# Patient Record
Sex: FEMALE | NOT HISPANIC OR LATINO | URBAN - METROPOLITAN AREA
[De-identification: names, ages, dates, MRNs, and addresses within clinical notes are randomized per-mention and may not be internally consistent; named-entity substitution may affect disease eponyms.]

---

## 2017-11-20 ENCOUNTER — INPATIENT (INPATIENT)
Facility: HOSPITAL | Age: 77
LOS: 6 days | Discharge: NURSING, REHAB OR LONG-TERM CARE | End: 2017-11-27
Attending: INTERNAL MEDICINE | Admitting: INTERNAL MEDICINE
Payer: MEDICARE

## 2017-11-20 VITALS
SYSTOLIC BLOOD PRESSURE: 107 MMHG | RESPIRATION RATE: 18 BRPM | TEMPERATURE: 98 F | DIASTOLIC BLOOD PRESSURE: 65 MMHG | OXYGEN SATURATION: 100 % | HEART RATE: 142 BPM

## 2017-11-20 DIAGNOSIS — R55 SYNCOPE AND COLLAPSE: ICD-10-CM

## 2017-11-20 LAB
ALBUMIN SERPL ELPH-MCNC: 3.3 G/DL — SIGNIFICANT CHANGE UP (ref 3.3–5)
ALP SERPL-CCNC: 83 U/L — SIGNIFICANT CHANGE UP (ref 40–120)
ALT FLD-CCNC: 7 U/L — SIGNIFICANT CHANGE UP (ref 4–33)
APTT BLD: 33.6 SEC — SIGNIFICANT CHANGE UP (ref 27.5–37.4)
AST SERPL-CCNC: 18 U/L — SIGNIFICANT CHANGE UP (ref 4–32)
BASE EXCESS BLDV CALC-SCNC: 2.7 MMOL/L — SIGNIFICANT CHANGE UP
BASOPHILS # BLD AUTO: 0.09 K/UL — SIGNIFICANT CHANGE UP (ref 0–0.2)
BASOPHILS NFR BLD AUTO: 0.9 % — SIGNIFICANT CHANGE UP (ref 0–2)
BILIRUB SERPL-MCNC: 0.2 MG/DL — SIGNIFICANT CHANGE UP (ref 0.2–1.2)
BLOOD GAS VENOUS - CREATININE: 1.41 MG/DL — HIGH (ref 0.5–1.3)
BUN SERPL-MCNC: 30 MG/DL — HIGH (ref 7–23)
CALCIUM SERPL-MCNC: 9.2 MG/DL — SIGNIFICANT CHANGE UP (ref 8.4–10.5)
CHLORIDE BLDV-SCNC: 111 MMOL/L — HIGH (ref 96–108)
CHLORIDE SERPL-SCNC: 106 MMOL/L — SIGNIFICANT CHANGE UP (ref 98–107)
CK MB BLD-MCNC: 1.12 NG/ML — SIGNIFICANT CHANGE UP (ref 1–4.7)
CK MB BLD-MCNC: SIGNIFICANT CHANGE UP (ref 0–2.5)
CK SERPL-CCNC: 43 U/L — SIGNIFICANT CHANGE UP (ref 25–170)
CO2 SERPL-SCNC: 24 MMOL/L — SIGNIFICANT CHANGE UP (ref 22–31)
CREAT SERPL-MCNC: 1.53 MG/DL — HIGH (ref 0.5–1.3)
EOSINOPHIL # BLD AUTO: 0.89 K/UL — HIGH (ref 0–0.5)
EOSINOPHIL NFR BLD AUTO: 8.8 % — HIGH (ref 0–6)
GAS PNL BLDV: 140 MMOL/L — SIGNIFICANT CHANGE UP (ref 136–146)
GLUCOSE BLDV-MCNC: 98 — SIGNIFICANT CHANGE UP (ref 70–99)
GLUCOSE SERPL-MCNC: 98 MG/DL — SIGNIFICANT CHANGE UP (ref 70–99)
HCO3 BLDV-SCNC: 24 MMOL/L — SIGNIFICANT CHANGE UP (ref 20–27)
HCT VFR BLD CALC: 38.8 % — SIGNIFICANT CHANGE UP (ref 34.5–45)
HCT VFR BLDV CALC: 37.9 % — SIGNIFICANT CHANGE UP (ref 34.5–45)
HGB BLD-MCNC: 11.8 G/DL — SIGNIFICANT CHANGE UP (ref 11.5–15.5)
HGB BLDV-MCNC: 12.3 G/DL — SIGNIFICANT CHANGE UP (ref 11.5–15.5)
IMM GRANULOCYTES # BLD AUTO: 0.05 # — SIGNIFICANT CHANGE UP
IMM GRANULOCYTES NFR BLD AUTO: 0.5 % — SIGNIFICANT CHANGE UP (ref 0–1.5)
INR BLD: 1.44 — HIGH (ref 0.88–1.17)
LACTATE BLDV-MCNC: 2.6 MMOL/L — HIGH (ref 0.5–2)
LYMPHOCYTES # BLD AUTO: 1.86 K/UL — SIGNIFICANT CHANGE UP (ref 1–3.3)
LYMPHOCYTES # BLD AUTO: 18.5 % — SIGNIFICANT CHANGE UP (ref 13–44)
MCHC RBC-ENTMCNC: 30.3 PG — SIGNIFICANT CHANGE UP (ref 27–34)
MCHC RBC-ENTMCNC: 30.4 % — LOW (ref 32–36)
MCV RBC AUTO: 99.5 FL — SIGNIFICANT CHANGE UP (ref 80–100)
MONOCYTES # BLD AUTO: 0.58 K/UL — SIGNIFICANT CHANGE UP (ref 0–0.9)
MONOCYTES NFR BLD AUTO: 5.8 % — SIGNIFICANT CHANGE UP (ref 2–14)
NEUTROPHILS # BLD AUTO: 6.61 K/UL — SIGNIFICANT CHANGE UP (ref 1.8–7.4)
NEUTROPHILS NFR BLD AUTO: 65.5 % — SIGNIFICANT CHANGE UP (ref 43–77)
NRBC # FLD: 0 — SIGNIFICANT CHANGE UP
PCO2 BLDV: 58 MMHG — HIGH (ref 41–51)
PH BLDV: 7.31 PH — LOW (ref 7.32–7.43)
PLATELET # BLD AUTO: 308 K/UL — SIGNIFICANT CHANGE UP (ref 150–400)
PMV BLD: 9.6 FL — SIGNIFICANT CHANGE UP (ref 7–13)
PO2 BLDV: < 24 MMHG — LOW (ref 35–40)
POTASSIUM BLDV-SCNC: 4.6 MMOL/L — HIGH (ref 3.4–4.5)
POTASSIUM SERPL-MCNC: 4.9 MMOL/L — SIGNIFICANT CHANGE UP (ref 3.5–5.3)
POTASSIUM SERPL-SCNC: 4.9 MMOL/L — SIGNIFICANT CHANGE UP (ref 3.5–5.3)
PROT SERPL-MCNC: 7.5 G/DL — SIGNIFICANT CHANGE UP (ref 6–8.3)
PROTHROM AB SERPL-ACNC: 16.3 SEC — HIGH (ref 9.8–13.1)
RBC # BLD: 3.9 M/UL — SIGNIFICANT CHANGE UP (ref 3.8–5.2)
RBC # FLD: 14 % — SIGNIFICANT CHANGE UP (ref 10.3–14.5)
SAO2 % BLDV: 21.9 % — LOW (ref 60–85)
SODIUM SERPL-SCNC: 144 MMOL/L — SIGNIFICANT CHANGE UP (ref 135–145)
TROPONIN T SERPL-MCNC: < 0.06 NG/ML — SIGNIFICANT CHANGE UP (ref 0–0.06)
TSH SERPL-MCNC: 3 UIU/ML — SIGNIFICANT CHANGE UP (ref 0.27–4.2)
WBC # BLD: 10.08 K/UL — SIGNIFICANT CHANGE UP (ref 3.8–10.5)
WBC # FLD AUTO: 10.08 K/UL — SIGNIFICANT CHANGE UP (ref 3.8–10.5)

## 2017-11-20 PROCEDURE — 71010: CPT | Mod: 26

## 2017-11-20 RX ORDER — SODIUM CHLORIDE 9 MG/ML
1000 INJECTION INTRAMUSCULAR; INTRAVENOUS; SUBCUTANEOUS ONCE
Qty: 0 | Refills: 0 | Status: COMPLETED | OUTPATIENT
Start: 2017-11-20 | End: 2017-11-20

## 2017-11-20 RX ADMIN — SODIUM CHLORIDE 1000 MILLILITER(S): 9 INJECTION INTRAMUSCULAR; INTRAVENOUS; SUBCUTANEOUS at 22:14

## 2017-11-20 NOTE — ED ADULT NURSE NOTE - PMH
Cellulitis    Diabetes mellitus    Hypertension    Obesity    Urinary incontinence  STRESS incontinence

## 2017-11-20 NOTE — ED ADULT NURSE NOTE - CHIEF COMPLAINT QUOTE
Patient from [facility] but was at a  service c/o witnessed syncope for about 1 minute. Patient denies any falls, was sitting in wheelchair. Patient reports chest pain prior to syncope. Patient c/o epigastric pain currently. Hx. CVA left side residuals, htn, DM.

## 2017-11-20 NOTE — ED ADULT TRIAGE NOTE - CHIEF COMPLAINT QUOTE
Patient from [facility] but was at a  service c/o witnessed syncope for about 1 minute. Patient denies any falls, was sitting in wheelchair. Patient reports chest pain prior to syncope. Patient c/o epigastric pain currently. Hx. CVA left side residuals, htn, DM.

## 2017-11-20 NOTE — ED PROVIDER NOTE - MEDICAL DECISION MAKING DETAILS
76yo female with syncope, labs, ekg, ce, cdu for tele admit 78yo female with syncope. Well appearing and now asymptomatic. labs, ekg, ce, and likely tele admit

## 2017-11-20 NOTE — ED PROVIDER NOTE - OBJECTIVE STATEMENT
78yo female h/o CVA, DM, HTN p/w syncope. pt was in [employer] sitting in wheel chair, started to feel unwell and was calling out to her friends and then became unresponsive and slumped over in her chair. was back to baseline when she woke up. Denies chest pain, SOB, recent illness, fevers, chills. Denies seizure like activity. feels at baseline, no complaints at present 78yo female h/o CVA/left residuals, DM, HTN p/w brief loss of consciousness. pt was in [employer] sitting in wheel chair during a , started to feel unwell and was calling out to her friends and then became unresponsive and slumped over in her chair. was almost back to baseline when she woke up but her niece states she was not making sense for a time and appeared confused.  Now back to [facility] ([place] . Denies chest pain, SOB, recent illness, fevers, chills. Denies seizure like activity. feels at baseline, no complaints at present.    Of note, initial HR documented in triage was  bpm.  Patient has baseline tremor and documented HR was d/t device error.  The ECG done at the same time showed HR approx 80 bpm.

## 2017-11-20 NOTE — ED PROVIDER NOTE - ATTENDING CONTRIBUTION TO CARE
Attending Attestation: Dr. Foster  I have personally performed a history and physical examination of the patient and discussed management with the resident as well as the patient.  I reviewed the resident's note and agree with the documented findings and plan of care.  I have authored and modified critical sections of the Provider Note, including but not limited to HPI, Physical Exam and MDM.  76yo female with syncope. Well appearing and now asymptomatic. labs, ekg, ce, and likely tele admit

## 2017-11-20 NOTE — ED ADULT NURSE NOTE - OBJECTIVE STATEMENT
pt received to room TR B pt is a&0x3 pt comes to ED by EMS for a syncopal episode at a  home. pt VSS at this time. pt has hx of stroke pt comes from a NH. pt complains of LLE x 2 weeks. 20g placed in R AC labs were drawn and sent EDMD at bedside for eval Will continue to monitor the pt

## 2017-11-21 DIAGNOSIS — E66.9 OBESITY, UNSPECIFIED: ICD-10-CM

## 2017-11-21 DIAGNOSIS — E11.9 TYPE 2 DIABETES MELLITUS WITHOUT COMPLICATIONS: ICD-10-CM

## 2017-11-21 DIAGNOSIS — I10 ESSENTIAL (PRIMARY) HYPERTENSION: ICD-10-CM

## 2017-11-21 DIAGNOSIS — H10.9 UNSPECIFIED CONJUNCTIVITIS: ICD-10-CM

## 2017-11-21 DIAGNOSIS — N17.9 ACUTE KIDNEY FAILURE, UNSPECIFIED: ICD-10-CM

## 2017-11-21 DIAGNOSIS — Z29.9 ENCOUNTER FOR PROPHYLACTIC MEASURES, UNSPECIFIED: ICD-10-CM

## 2017-11-21 DIAGNOSIS — I48.0 PAROXYSMAL ATRIAL FIBRILLATION: ICD-10-CM

## 2017-11-21 DIAGNOSIS — R55 SYNCOPE AND COLLAPSE: ICD-10-CM

## 2017-11-21 DIAGNOSIS — E78.5 HYPERLIPIDEMIA, UNSPECIFIED: ICD-10-CM

## 2017-11-21 LAB
AMORPH CRY # UR COMP ASSIST: SIGNIFICANT CHANGE UP (ref 0–0)
APPEARANCE UR: CLEAR — SIGNIFICANT CHANGE UP
BACTERIA # UR AUTO: SIGNIFICANT CHANGE UP
BILIRUB UR-MCNC: NEGATIVE — SIGNIFICANT CHANGE UP
BLOOD UR QL VISUAL: NEGATIVE — SIGNIFICANT CHANGE UP
BUN SERPL-MCNC: 29 MG/DL — HIGH (ref 7–23)
CALCIUM SERPL-MCNC: 8.7 MG/DL — SIGNIFICANT CHANGE UP (ref 8.4–10.5)
CHLORIDE SERPL-SCNC: 107 MMOL/L — SIGNIFICANT CHANGE UP (ref 98–107)
CHOLEST SERPL-MCNC: 114 MG/DL — LOW (ref 120–199)
CK MB BLD-MCNC: 1.05 NG/ML — SIGNIFICANT CHANGE UP (ref 1–4.7)
CK SERPL-CCNC: 46 U/L — SIGNIFICANT CHANGE UP (ref 25–170)
CO2 SERPL-SCNC: 25 MMOL/L — SIGNIFICANT CHANGE UP (ref 22–31)
COLOR SPEC: SIGNIFICANT CHANGE UP
CREAT SERPL-MCNC: 1.38 MG/DL — HIGH (ref 0.5–1.3)
GLUCOSE SERPL-MCNC: 136 MG/DL — HIGH (ref 70–99)
GLUCOSE UR-MCNC: NEGATIVE — SIGNIFICANT CHANGE UP
HBA1C BLD-MCNC: 6 % — HIGH (ref 4–5.6)
HCT VFR BLD CALC: 35.1 % — SIGNIFICANT CHANGE UP (ref 34.5–45)
HDLC SERPL-MCNC: 53 MG/DL — SIGNIFICANT CHANGE UP (ref 45–65)
HGB BLD-MCNC: 10.5 G/DL — LOW (ref 11.5–15.5)
KETONES UR-MCNC: NEGATIVE — SIGNIFICANT CHANGE UP
LEUKOCYTE ESTERASE UR-ACNC: HIGH
LIPID PNL WITH DIRECT LDL SERPL: 47 MG/DL — SIGNIFICANT CHANGE UP
MAGNESIUM SERPL-MCNC: 1.7 MG/DL — SIGNIFICANT CHANGE UP (ref 1.6–2.6)
MCHC RBC-ENTMCNC: 29.7 PG — SIGNIFICANT CHANGE UP (ref 27–34)
MCHC RBC-ENTMCNC: 29.9 % — LOW (ref 32–36)
MCV RBC AUTO: 99.4 FL — SIGNIFICANT CHANGE UP (ref 80–100)
MUCOUS THREADS # UR AUTO: SIGNIFICANT CHANGE UP
NITRITE UR-MCNC: NEGATIVE — SIGNIFICANT CHANGE UP
NRBC # FLD: 0 — SIGNIFICANT CHANGE UP
PH UR: 6 — SIGNIFICANT CHANGE UP (ref 4.6–8)
PLATELET # BLD AUTO: 302 K/UL — SIGNIFICANT CHANGE UP (ref 150–400)
PMV BLD: 9.9 FL — SIGNIFICANT CHANGE UP (ref 7–13)
POTASSIUM SERPL-MCNC: 4.4 MMOL/L — SIGNIFICANT CHANGE UP (ref 3.5–5.3)
POTASSIUM SERPL-SCNC: 4.4 MMOL/L — SIGNIFICANT CHANGE UP (ref 3.5–5.3)
PROT UR-MCNC: 20 — SIGNIFICANT CHANGE UP
RBC # BLD: 3.53 M/UL — LOW (ref 3.8–5.2)
RBC # FLD: 14.1 % — SIGNIFICANT CHANGE UP (ref 10.3–14.5)
RBC CASTS # UR COMP ASSIST: HIGH (ref 0–?)
SODIUM SERPL-SCNC: 146 MMOL/L — HIGH (ref 135–145)
SP GR SPEC: 1.02 — SIGNIFICANT CHANGE UP (ref 1–1.03)
SQUAMOUS # UR AUTO: SIGNIFICANT CHANGE UP
TRIGL SERPL-MCNC: 127 MG/DL — SIGNIFICANT CHANGE UP (ref 10–149)
TROPONIN T SERPL-MCNC: < 0.06 NG/ML — SIGNIFICANT CHANGE UP (ref 0–0.06)
UROBILINOGEN FLD QL: NORMAL E.U. — SIGNIFICANT CHANGE UP (ref 0.1–0.2)
WBC # BLD: 10.06 K/UL — SIGNIFICANT CHANGE UP (ref 3.8–10.5)
WBC # FLD AUTO: 10.06 K/UL — SIGNIFICANT CHANGE UP (ref 3.8–10.5)
WBC UR QL: SIGNIFICANT CHANGE UP (ref 0–?)

## 2017-11-21 PROCEDURE — 70450 CT HEAD/BRAIN W/O DYE: CPT | Mod: 26

## 2017-11-21 RX ORDER — FERROUS SULFATE 325(65) MG
1 TABLET ORAL
Qty: 0 | Refills: 0 | COMMUNITY

## 2017-11-21 RX ORDER — LEVETIRACETAM 250 MG/1
1 TABLET, FILM COATED ORAL
Qty: 0 | Refills: 0 | COMMUNITY

## 2017-11-21 RX ORDER — TRAZODONE HCL 50 MG
50 TABLET ORAL DAILY
Qty: 0 | Refills: 0 | Status: DISCONTINUED | OUTPATIENT
Start: 2017-11-21 | End: 2017-11-27

## 2017-11-21 RX ORDER — METOPROLOL TARTRATE 50 MG
12.5 TABLET ORAL
Qty: 0 | Refills: 0 | Status: DISCONTINUED | OUTPATIENT
Start: 2017-11-21 | End: 2017-11-27

## 2017-11-21 RX ORDER — LEVETIRACETAM 250 MG/1
500 TABLET, FILM COATED ORAL
Qty: 0 | Refills: 0 | Status: DISCONTINUED | OUTPATIENT
Start: 2017-11-21 | End: 2017-11-27

## 2017-11-21 RX ORDER — ROSUVASTATIN CALCIUM 5 MG/1
1 TABLET ORAL
Qty: 0 | Refills: 0 | COMMUNITY

## 2017-11-21 RX ORDER — TOBRAMYCIN 0.3 %
1 DROPS OPHTHALMIC (EYE)
Qty: 0 | Refills: 0 | COMMUNITY
End: 2017-12-01

## 2017-11-21 RX ORDER — GABAPENTIN 400 MG/1
200 CAPSULE ORAL
Qty: 0 | Refills: 0 | Status: DISCONTINUED | OUTPATIENT
Start: 2017-11-21 | End: 2017-11-27

## 2017-11-21 RX ORDER — METFORMIN HYDROCHLORIDE 850 MG/1
1 TABLET ORAL
Qty: 0 | Refills: 0 | COMMUNITY

## 2017-11-21 RX ORDER — ISOSORBIDE MONONITRATE 60 MG/1
30 TABLET, EXTENDED RELEASE ORAL DAILY
Qty: 0 | Refills: 0 | Status: DISCONTINUED | OUTPATIENT
Start: 2017-11-21 | End: 2017-11-27

## 2017-11-21 RX ORDER — DEXTROSE 50 % IN WATER 50 %
25 SYRINGE (ML) INTRAVENOUS ONCE
Qty: 0 | Refills: 0 | Status: DISCONTINUED | OUTPATIENT
Start: 2017-11-21 | End: 2017-11-27

## 2017-11-21 RX ORDER — SIMETHICONE 80 MG/1
1 TABLET, CHEWABLE ORAL
Qty: 0 | Refills: 0 | COMMUNITY

## 2017-11-21 RX ORDER — INSULIN LISPRO 100/ML
0 VIAL (ML) SUBCUTANEOUS
Qty: 0 | Refills: 0 | COMMUNITY

## 2017-11-21 RX ORDER — INSULIN LISPRO 100/ML
VIAL (ML) SUBCUTANEOUS
Qty: 0 | Refills: 0 | Status: DISCONTINUED | OUTPATIENT
Start: 2017-11-21 | End: 2017-11-27

## 2017-11-21 RX ORDER — INSULIN LISPRO 100/ML
VIAL (ML) SUBCUTANEOUS AT BEDTIME
Qty: 0 | Refills: 0 | Status: DISCONTINUED | OUTPATIENT
Start: 2017-11-21 | End: 2017-11-27

## 2017-11-21 RX ORDER — DEXTROSE 50 % IN WATER 50 %
1 SYRINGE (ML) INTRAVENOUS ONCE
Qty: 0 | Refills: 0 | Status: DISCONTINUED | OUTPATIENT
Start: 2017-11-21 | End: 2017-11-27

## 2017-11-21 RX ORDER — HYDROCORTISONE 1 %
1 OINTMENT (GRAM) TOPICAL
Qty: 0 | Refills: 0 | Status: DISCONTINUED | OUTPATIENT
Start: 2017-11-21 | End: 2017-11-27

## 2017-11-21 RX ORDER — TOBRAMYCIN 0.3 %
1 DROPS OPHTHALMIC (EYE) THREE TIMES A DAY
Qty: 0 | Refills: 0 | Status: DISCONTINUED | OUTPATIENT
Start: 2017-11-21 | End: 2017-11-27

## 2017-11-21 RX ORDER — CHOLECALCIFEROL (VITAMIN D3) 125 MCG
1 CAPSULE ORAL
Qty: 0 | Refills: 0 | COMMUNITY

## 2017-11-21 RX ORDER — FERROUS SULFATE 325(65) MG
325 TABLET ORAL DAILY
Qty: 0 | Refills: 0 | Status: DISCONTINUED | OUTPATIENT
Start: 2017-11-21 | End: 2017-11-27

## 2017-11-21 RX ORDER — ACETAMINOPHEN 500 MG
2 TABLET ORAL
Qty: 0 | Refills: 0 | COMMUNITY

## 2017-11-21 RX ORDER — LISINOPRIL 2.5 MG/1
1 TABLET ORAL
Qty: 0 | Refills: 0 | COMMUNITY

## 2017-11-21 RX ORDER — RIVAROXABAN 15 MG-20MG
15 KIT ORAL EVERY 24 HOURS
Qty: 0 | Refills: 0 | Status: DISCONTINUED | OUTPATIENT
Start: 2017-11-21 | End: 2017-11-27

## 2017-11-21 RX ORDER — TRAZODONE HCL 50 MG
1 TABLET ORAL
Qty: 0 | Refills: 0 | COMMUNITY

## 2017-11-21 RX ORDER — FONDAPARINUX SODIUM 2.5 MG/.5ML
1 INJECTION, SOLUTION SUBCUTANEOUS
Qty: 0 | Refills: 0 | COMMUNITY

## 2017-11-21 RX ORDER — ATORVASTATIN CALCIUM 80 MG/1
40 TABLET, FILM COATED ORAL AT BEDTIME
Qty: 0 | Refills: 0 | Status: DISCONTINUED | OUTPATIENT
Start: 2017-11-21 | End: 2017-11-27

## 2017-11-21 RX ORDER — MAGNESIUM SULFATE 500 MG/ML
1 VIAL (ML) INJECTION ONCE
Qty: 0 | Refills: 0 | Status: COMPLETED | OUTPATIENT
Start: 2017-11-21 | End: 2017-11-21

## 2017-11-21 RX ORDER — SODIUM CHLORIDE 9 MG/ML
1000 INJECTION INTRAMUSCULAR; INTRAVENOUS; SUBCUTANEOUS
Qty: 0 | Refills: 0 | Status: DISCONTINUED | OUTPATIENT
Start: 2017-11-21 | End: 2017-11-27

## 2017-11-21 RX ORDER — CHOLECALCIFEROL (VITAMIN D3) 125 MCG
1000 CAPSULE ORAL DAILY
Qty: 0 | Refills: 0 | Status: DISCONTINUED | OUTPATIENT
Start: 2017-11-21 | End: 2017-11-27

## 2017-11-21 RX ORDER — DEXTROSE 50 % IN WATER 50 %
12.5 SYRINGE (ML) INTRAVENOUS ONCE
Qty: 0 | Refills: 0 | Status: DISCONTINUED | OUTPATIENT
Start: 2017-11-21 | End: 2017-11-27

## 2017-11-21 RX ORDER — LUBIPROSTONE 24 UG/1
1 CAPSULE, GELATIN COATED ORAL
Qty: 0 | Refills: 0 | COMMUNITY

## 2017-11-21 RX ORDER — PSYLLIUM SEED (WITH DEXTROSE)
0 POWDER (GRAM) ORAL
Qty: 0 | Refills: 0 | COMMUNITY

## 2017-11-21 RX ORDER — INSULIN GLARGINE 100 [IU]/ML
14 INJECTION, SOLUTION SUBCUTANEOUS AT BEDTIME
Qty: 0 | Refills: 0 | Status: DISCONTINUED | OUTPATIENT
Start: 2017-11-21 | End: 2017-11-27

## 2017-11-21 RX ORDER — METOPROLOL TARTRATE 50 MG
12.5 TABLET ORAL
Qty: 0 | Refills: 0 | COMMUNITY

## 2017-11-21 RX ORDER — GLUCAGON INJECTION, SOLUTION 0.5 MG/.1ML
1 INJECTION, SOLUTION SUBCUTANEOUS ONCE
Qty: 0 | Refills: 0 | Status: DISCONTINUED | OUTPATIENT
Start: 2017-11-21 | End: 2017-11-27

## 2017-11-21 RX ORDER — ISOSORBIDE MONONITRATE 60 MG/1
1 TABLET, EXTENDED RELEASE ORAL
Qty: 0 | Refills: 0 | COMMUNITY

## 2017-11-21 RX ORDER — INSULIN GLARGINE 100 [IU]/ML
14 INJECTION, SOLUTION SUBCUTANEOUS
Qty: 0 | Refills: 0 | COMMUNITY

## 2017-11-21 RX ORDER — SODIUM CHLORIDE 9 MG/ML
1000 INJECTION, SOLUTION INTRAVENOUS
Qty: 0 | Refills: 0 | Status: DISCONTINUED | OUTPATIENT
Start: 2017-11-21 | End: 2017-11-27

## 2017-11-21 RX ORDER — ACETAMINOPHEN 500 MG
650 TABLET ORAL EVERY 6 HOURS
Qty: 0 | Refills: 0 | Status: DISCONTINUED | OUTPATIENT
Start: 2017-11-21 | End: 2017-11-27

## 2017-11-21 RX ADMIN — SODIUM CHLORIDE 50 MILLILITER(S): 9 INJECTION INTRAMUSCULAR; INTRAVENOUS; SUBCUTANEOUS at 03:45

## 2017-11-21 RX ADMIN — LEVETIRACETAM 500 MILLIGRAM(S): 250 TABLET, FILM COATED ORAL at 23:01

## 2017-11-21 RX ADMIN — ISOSORBIDE MONONITRATE 30 MILLIGRAM(S): 60 TABLET, EXTENDED RELEASE ORAL at 12:27

## 2017-11-21 RX ADMIN — Medication 12.5 MILLIGRAM(S): at 18:48

## 2017-11-21 RX ADMIN — Medication 650 MILLIGRAM(S): at 03:46

## 2017-11-21 RX ADMIN — RIVAROXABAN 15 MILLIGRAM(S): KIT at 18:48

## 2017-11-21 RX ADMIN — Medication 650 MILLIGRAM(S): at 12:57

## 2017-11-21 RX ADMIN — Medication 1 DROP(S): at 21:25

## 2017-11-21 RX ADMIN — Medication 650 MILLIGRAM(S): at 12:27

## 2017-11-21 RX ADMIN — INSULIN GLARGINE 14 UNIT(S): 100 INJECTION, SOLUTION SUBCUTANEOUS at 21:24

## 2017-11-21 RX ADMIN — Medication 1000 UNIT(S): at 12:27

## 2017-11-21 RX ADMIN — Medication 1 DROP(S): at 06:39

## 2017-11-21 RX ADMIN — Medication 1 DROP(S): at 13:23

## 2017-11-21 RX ADMIN — Medication 650 MILLIGRAM(S): at 04:36

## 2017-11-21 RX ADMIN — ATORVASTATIN CALCIUM 40 MILLIGRAM(S): 80 TABLET, FILM COATED ORAL at 21:27

## 2017-11-21 RX ADMIN — Medication 12.5 MILLIGRAM(S): at 06:38

## 2017-11-21 RX ADMIN — Medication 50 MILLIGRAM(S): at 13:23

## 2017-11-21 RX ADMIN — Medication 100 GRAM(S): at 21:24

## 2017-11-21 RX ADMIN — Medication 325 MILLIGRAM(S): at 12:27

## 2017-11-21 RX ADMIN — LEVETIRACETAM 500 MILLIGRAM(S): 250 TABLET, FILM COATED ORAL at 06:39

## 2017-11-21 RX ADMIN — GABAPENTIN 200 MILLIGRAM(S): 400 CAPSULE ORAL at 18:48

## 2017-11-21 RX ADMIN — GABAPENTIN 200 MILLIGRAM(S): 400 CAPSULE ORAL at 06:39

## 2017-11-21 RX ADMIN — Medication 1 APPLICATION(S): at 23:01

## 2017-11-21 NOTE — H&P ADULT - ATTENDING COMMENTS
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    #Syncope-   Patient has ruled out for acute coronary syndrome.  No ventricular ectopy or bradycardia on telemetry thus far.  Admit to telemetry and obtain TTE to assess for valvulopathy (known RV dysfunction).    #A-fib- rate controlled.   Continue Xarelto and statin for h/o CVA    #HTN- BP controlled on current regimen. 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    #Syncope-   Patient has ruled out for acute coronary syndrome.  No ventricular ectopy or bradycardia on telemetry thus far.  Admit to telemetry and obtain TTE to assess for valvulopathy (known RV dysfunction).    #Paroxysmal A-fib- Admission EKG is sinus rhythm with no ischemic changes.  Continue Xarelto and statin for h/o CVA    #HTN- BP controlled on current regimen.

## 2017-11-21 NOTE — H&P ADULT - PROBLEM SELECTOR PLAN 7
[id number] score of 7, as per talking to nursing home patient used to take Coumadin and was started on Xarelto on 8/2017 for atrial fibrillation  Will continue with Xarelto for now  Continue with Metoprolol for rate control

## 2017-11-21 NOTE — H&P ADULT - RS GEN PE MLT RESP DETAILS PC
no intercostal retractions/no wheezes/normal/good air movement/clear to auscultation bilaterally/no chest wall tenderness/airway patent/respirations non-labored/breath sounds equal/no rales/no rhonchi

## 2017-11-21 NOTE — H&P ADULT - NEGATIVE OPHTHALMOLOGIC SYMPTOMS
no photophobia/no lacrimation L/no lacrimation R/no blurred vision L/no blurred vision R/no discharge R/no discharge L/no diplopia

## 2017-11-21 NOTE — H&P ADULT - GASTROINTESTINAL DETAILS
bowel sounds normal/nontender/no distention/soft/no guarding/normal/no rebound tenderness/no rigidity

## 2017-11-21 NOTE — H&P ADULT - PROBLEM SELECTOR PLAN 2
BUN/Cr: 30/1.53 likely secondary to dehydration as Cr was normal last admission in 2012  Avoid nephrotoxic medications   Consider Renal consult in am if Cr does not improved after fluids

## 2017-11-21 NOTE — H&P ADULT - HISTORY OF PRESENT ILLNESS
78 y/o F with PMH of Obesity, HTN, DM, CVA(with left sided residuals), HLD, Depression, Asthma, ?Possible atrial fibrillation(on Xarelto) presented with the complaint of syncope while at [employer] for a . As per the patient she was in her usual state of health and was at a  yesterday when she had an episode of syncope. Patient stated that she was sitting in the wheelchair and then felt "warm all over" and then remembers people around her trying to wake her up. Patient is unsure how long she had LOC, and when she woke up she knew where she was but was "a little confused and not making sense at that time". Patient stated that prior to passing out she felt lightheadedness and dizziness. Patient does not think she had any head trauma or ay falls. Patient unsure if she had any seizure like activity, tongue laceration, or any bowel or bladder incontinence. Patient denied any headaches, changes in vision, CP, fevers, chills, N/V/D/C, abdominal pain, dysuria, cough, melena, hematochezia, recent travel, sick contact, pleuritic or positional chest pain.      On ED admission EKG revealed EKG: Atrial fibrillation at a rate of 86 with QTc of 440, CE x 1: Negative, BUN/Cr: 30/1.53. Prelim CXR: No emergent findings. When examined patient is resting in the stretcher and denied any current complaints.

## 2017-11-21 NOTE — H&P ADULT - NSHPLABSRESULTS_GEN_ALL_CORE
11.8   10.08 )-----------( 308      ( 20 Nov 2017 21:35 )             38.8   11-20    144  |  106  |  30<H>  ----------------------------<  98  4.9   |  24  |  1.53<H>    Ca    9.2      20 Nov 2017 21:35    TPro  7.5  /  Alb  3.3  /  TBili  0.2  /  DBili  x   /  AST  18  /  ALT  7   /  AlkPhos  83  11-20    CARDIAC MARKERS ( 20 Nov 2017 21:35 )  x     / < 0.06 ng/mL / 43 u/L / 1.12 ng/mL / x        Prelim CXR: No emergent findings.    EKG: Atrial fibrillation at a rate of 86 with QTc of 440

## 2017-11-21 NOTE — PROVIDER CONTACT NOTE (OTHER) - ASSESSMENT
Patient blood glucose noted to be at 67. Patient asymptomatic at this time. VS within normal limits. No complaints of dizziness or headache, no diaphoresis noted.

## 2017-11-21 NOTE — H&P ADULT - NEGATIVE NEUROLOGICAL SYMPTOMS
no weakness/no paresthesias/no generalized seizures/no transient paralysis/no vertigo/no loss of sensation/no headache/no tremors/no focal seizures/no hemiparesis

## 2017-11-21 NOTE — H&P ADULT - PROBLEM SELECTOR PLAN 6
Encourage weight loss via diet and exercise  Consult with patient about healthy eating habits and various exercises

## 2017-11-21 NOTE — H&P ADULT - PROBLEM SELECTOR PLAN 1
Differential include dehydration vs cardiac arrhythmia vs possible hypoglycemia   Will monitor on telemetry, serial CE's, serial EKG prn for any episodes of chest pain   HgbA1C, TSH, lipid profile, CBC, CMP in am   TTE ordered to evaluate LVEF   Orthostatics ordered  CT head w/o ordered   UA/Urine cultures ordered   Consider calling neurology consult in am for Hx of seizures and here with syncope Differential include dehydration vs cardiac arrhythmia vs possible hypoglycemia. Patient s/p 1L of fluid in the ED.    Will monitor on telemetry, serial CE's, serial EKG prn for any episodes of chest pain   HgbA1C, TSH, lipid profile, CBC, CMP in am   TTE ordered to evaluate LVEF   Orthostatics ordered, and started on IVF at 50cc/hr for 8 hours   CT head w/o ordered   UA/Urine cultures ordered   Consider calling neurology consult in am as patient with Hx of seizures and here with syncope  Continue with Keppra 500mg BID   Will check Keppra level with am labs

## 2017-11-21 NOTE — H&P ADULT - PROBLEM SELECTOR PLAN 3
As per talking to nursing home patient is to be started on Tobramycin for right eye conjunctivitis yesterday 11/20 and to continue for 10 days.  Will order Tobramycin

## 2017-11-21 NOTE — CONSULT NOTE ADULT - SUBJECTIVE AND OBJECTIVE BOX
Chief Complaint/Reason for Admission: Syncope	  History of Present Illness: 	  78 y/o F with PMH of Obesity, HTN, DM, CVA(with left sided residuals), HLD, Depression, Asthma, ?Possible atrial fibrillation(on Xarelto) presented with the complaint of syncope while at [employer] for a . As per the patient she was in her usual state of health and was at a  yesterday when she had an episode of syncope. Patient stated that she was sitting in the wheelchair and then felt "warm all over" and then remembers people around her trying to wake her up. Patient is unsure how long she had LOC, and when she woke up she knew where she was but was "a little confused and not making sense at that time". Patient stated that prior to passing out she felt lightheadedness and dizziness. Patient does not think she had any head trauma or ay falls. Patient unsure if she had any seizure like activity, tongue laceration, or any bowel or bladder incontinence. Patient denied any headaches, changes in vision, CP, fevers, chills, N/V/D/C, abdominal pain, dysuria, cough, melena, hematochezia, recent travel, sick contact, pleuritic or positional chest pain.      On ED admission EKG revealed EKG: Atrial fibrillation at a rate of 86 with QTc of 440, CE x 1: Negative, BUN/Cr: 30/1.53. Prelim CXR: No emergent findings. When examined patient is resting in the stretcher and denied any current complaints.     Review of Systems:  · Negative General Symptoms	no fever; no chills; no sweating; no anorexia; no weight loss; no weight gain; no malaise; no fatigue	  · Negative Skin Symptoms	no rash; no itching; no dryness	  · Negative Ophthalmologic Symptoms	no diplopia; no photophobia; no lacrimation L; no lacrimation R; no blurred vision L; no blurred vision R; no discharge L; no discharge R	  · Negative ENMT Symptoms	no hearing difficulty; no ear pain; no tinnitus; no vertigo; no sinus symptoms; no nasal congestion; no nasal discharge	  · Negative Respiratory and Thorax Symptoms	no wheezing; no cough; no hemoptysis; no pleuritic chest pain	  · Respiratory and Thorax Symptoms	dyspnea	  · Negative Cardiovascular Symptoms	no chest pain; no palpitations; no dyspnea on exertion; no orthopnea; no paroxysmal nocturnal dyspnea	  · Cardiovascular Symptoms	peripheral edema	  · Negative Gastrointestinal Symptoms	no nausea; no vomiting; no diarrhea; no constipation; no change in bowel habits; no abdominal pain; no melena; no hematochezia	  · Negative General Genitourinary Symptoms	no hematuria; no renal colic; no flank pain L; no flank pain R; no dysuria	  · Negative Musculoskeletal Symptoms	no arthralgia; no joint swelling; no myalgia; no muscle cramps; no muscle weakness; no stiffness	  · Musculoskeletal Symptoms	arthritis	  · Negative Neurological Symptoms	no transient paralysis; no weakness; no paresthesias; no generalized seizures; no focal seizures; no tremors; no vertigo; no loss of sensation; no headache; no hemiparesis	  · Neurological Symptoms	syncope; difficulty walking; loss of consciousness; confusion	      Allergies and Intolerances:        Allergies:  	No Known Allergies:     Home Medications:   * Patient Currently Takes Medications as of 2017 01:42 documented in Structured Notes  · 	Amitiza 24 mcg oral capsule: 1 cap(s) orally 2 times a day  · 	Crestor 10 mg oral tablet: 1 tab(s) orally once a day (at bedtime)  · 	simethicone 80 mg oral tablet: 1 tab(s) orally 2 times a day  · 	Keppra 500 mg oral tablet: 1 tab(s) orally 2 times a day  · 	isosorbide mononitrate 30 mg oral tablet, extended release: 1 tab(s) orally once a day (in the morning)  · 	Vitamin D3 1000 intl units oral tablet: 1 tab(s) orally once a day  · 	metFORMIN 1000 mg oral tablet: 1 tab(s) orally 2 times a day  · 	traZODone 50 mg oral tablet: 1 tab(s) orally once a day  · 	Lantus 100 units/mL subcutaneous solution: 14 unit(s) subcutaneous once a day (at bedtime)  · 	ferrous sulfate 325 mg (65 mg elemental iron) oral delayed release tablet: 1 tab(s) orally once a day  · 	Artificial Tears ophthalmic solution: 1 drop(s) to each affected eye 2 times a day  · 	Neurontin 300 mg oral capsule: 1 cap(s) orally 4 times a day  · 	Xarelto 15 mg oral tablet: 1 tab(s) orally once a day (in the evening)  · 	lisinopril 2.5 mg oral tablet: 1 tab(s) orally once a day  · 	hydroCHLOROthiazide 12.5 mg oral capsule: 1 cap(s) orally once a day  · 	metoprolol: 12.5 milligram(s) orally 2 times a day  · 	tobramycin 0.3% ophthalmic solution: 1 drop(s) to each affected eye 3 times a day  · 	Tylenol 325 mg oral tablet: 2 tab(s) orally every 4 hours, As Needed - for muscle spasm, for moderate pain  · 	Metamucil 3.4 g/5.2 g oral powder for reconstitution:   · 	HumaLOG 100 units/mL subcutaneous solution: Sliding scale    Patient History:   Past Medical History:  Cellulitis    Diabetes mellitus    Hypertension    Obesity    Urinary incontinence  STRESS incontinence.    Past Surgical History:  History of hysterectomy    S/P knee replacement.    Family History:  No pertinent family history in first degree relatives.    Social History:  Social History (marital status, living situation, occupation, tobacco use, alcohol and drug use, and sexual history): , lives at rehab, retired Never smoked/but used to drink occasionally	    Tobacco Screening:  · Core Measure Site	No	  · Has the patient used tobacco in the past 30 days?	No	    Risk Assessment:   Present on Admission:  Deep Venous Thrombosis	no	  Pulmonary Embolus	no	  Urinary Catheter	no	  Central Venous Catheter/PICC Line	no	  Surgical Site Incision	no	  Pressure Ulcer(s)	no	    Heart Failure:  Does this patient have a history of or has been diagnosed with heart failure? no.      Physical Exam:  · Constitutional	detailed exam	  · Constitutional Details	well-developed; well-groomed; well-nourished; obese	  · Eyes	detailed exam	  · Eyes Details	conjunctiva clear	  · Neck	detailed exam	  · Neck Details	normal; supple; no JVD	  · Respiratory	detailed exam	  · Respiratory Details	normal; airway patent; breath sounds equal; good air movement; respirations non-labored; clear to auscultation bilaterally; no chest wall tenderness; no intercostal retractions; no rales; no rhonchi; no wheezes	  · Cardiovascular	detailed exam	  · Cardiovascular Details	regular rate and rhythm  no rub  no murmur	  · Cardiovascular Details	positive S1; positive S2	  · Gastrointestinal	detailed exam	  · GI Normal	normal; soft; nontender; no distention; bowel sounds normal; no rebound tenderness; no guarding; no rigidity	  · Extremities	detailed exam	  · Extremities Details	normal; no clubbing; no cyanosis; pedal edema	  · Pedal Edema Severity	1+	  · Pedal Edema Type	non-pitting	  · Extremities Comments	+1 non-pitting traceable edema noted b/l from ankle to mid tibia	  · Vascular	detailed exam	  · Radial Pulse	right normal; left normal; +2 pulses b/l	  · DP Pulse	+1 pulses b/l	  · PT Pulse	+1 pulses b/l	  · Neurological	detailed exam	  · Neurological Details	alert and oriented x 3; responds to verbal commands; sensation intact	  · Skin	detailed exam	  · Skin Comments	Bilateral LE petechiae noted in the anterior and mid tibia	  · Musculoskeletal	detailed exam	  · Musculoskeletal Details	no joint swelling; no joint erythema; no joint warmth	      Labs and Results:  Labs, Radiology, Cardiology, and Other Results: 11.8   10.08 )-----------( 308      ( 2017 21:35 )             38.8   11-20   144  |  106  |  30<H>  ----------------------------<  98  4.9   |  24  |  1.53<H>   Ca    9.2      2017 21:35   TPro  7.5  /  Alb  3.3  /  TBili  0.2  /  DBili  x   /  AST  18  /  ALT  7   /  AlkPhos  83  11-20   CARDIAC MARKERS ( 2017 21:35 )  x     / < 0.06 ng/mL / 43 u/L / 1.12 ng/mL / x       Prelim CXR: No emergent findings.   EKG: Atrial fibrillation at a rate of 86 with QTc of 440	    Assessment and Plan:   Assessment:  · Assessment		  78 y/o F with PMH of Obesity, HTN, DM, CVA(with left sided residuals), HLD, Depression, Asthma, ?Possible atrial fibrillation(on Xarelto) presented with the complaint of syncope while at [employer] for a     Problem/Plan - 1:  ·  Problem: Syncope, unspecified syncope type.  Plan: Differential include dehydration vs cardiac arrhythmia vs possible hypoglycemia. Patient s/p 1L of fluid in the ED.    Will monitor on telemetry, serial CE's, serial EKG prn for any episodes of chest pain   HgbA1C, TSH, lipid profile, CBC, CMP in am   TTE ordered to evaluate LVEF   Orthostatics ordered, and started on IVF at 50cc/hr for 8 hours   CT head w/o ordered   UA/Urine cultures ordered   Consider calling neurology consult in am as patient with Hx of seizures and here with syncope  Continue with Keppra 500mg BID   Will check Keppra level with am labs.     Problem/Plan - 2:  ·  Problem: NIMA (acute kidney injury).  Plan: BUN/Cr: 30/1.53 likely secondary to dehydration as Cr was normal last admission in   Avoid nephrotoxic medications     Problem/Plan - 3:  ·  Problem: Conjunctivitis.  Plan: As per talking to nursing home patient is to be started on Tobramycin for right eye conjunctivitis yesterday  and to continue for 10 days.  Will order Tobramycin.     Problem/Plan - 4:  ·  Problem: Hypertension.  Plan: Continue with antihypertensive medications   DASH diet recommended.     Problem/Plan - 5:  ·  Problem: Type 2 diabetes mellitus without complication, with long-term current use of insulin.  Plan: On insulin sliding scale(humalog), Lantus 14U QHS  FS TID and at bedtime, HgbA1C in am.

## 2017-11-21 NOTE — H&P ADULT - ASSESSMENT
78 y/o F with PMH of Obesity, HTN, DM, CVA(with left sided residuals), HLD, Depression, Asthma, ?Possible atrial fibrillation(on Xarelto) presented with the complaint of syncope while at [employer] for a

## 2017-11-21 NOTE — H&P ADULT - NEGATIVE GASTROINTESTINAL SYMPTOMS
no vomiting/no diarrhea/no nausea/no constipation/no melena/no change in bowel habits/no hematochezia/no abdominal pain

## 2017-11-21 NOTE — H&P ADULT - NEGATIVE ENMT SYMPTOMS
no ear pain/no tinnitus/no hearing difficulty/no sinus symptoms/no nasal discharge/no vertigo/no nasal congestion

## 2017-11-22 LAB
BASOPHILS # BLD AUTO: 0.08 K/UL — SIGNIFICANT CHANGE UP (ref 0–0.2)
BASOPHILS NFR BLD AUTO: 1 % — SIGNIFICANT CHANGE UP (ref 0–2)
BUN SERPL-MCNC: 31 MG/DL — HIGH (ref 7–23)
BUN SERPL-MCNC: 31 MG/DL — HIGH (ref 7–23)
CALCIUM SERPL-MCNC: 9 MG/DL — SIGNIFICANT CHANGE UP (ref 8.4–10.5)
CALCIUM SERPL-MCNC: 9 MG/DL — SIGNIFICANT CHANGE UP (ref 8.4–10.5)
CHLORIDE SERPL-SCNC: 108 MMOL/L — HIGH (ref 98–107)
CHLORIDE SERPL-SCNC: 108 MMOL/L — HIGH (ref 98–107)
CO2 SERPL-SCNC: 23 MMOL/L — SIGNIFICANT CHANGE UP (ref 22–31)
CO2 SERPL-SCNC: 23 MMOL/L — SIGNIFICANT CHANGE UP (ref 22–31)
CREAT SERPL-MCNC: 1.33 MG/DL — HIGH (ref 0.5–1.3)
CREAT SERPL-MCNC: 1.33 MG/DL — HIGH (ref 0.5–1.3)
EOSINOPHIL # BLD AUTO: 1.18 K/UL — HIGH (ref 0–0.5)
EOSINOPHIL NFR BLD AUTO: 15.3 % — HIGH (ref 0–6)
GLUCOSE SERPL-MCNC: 103 MG/DL — HIGH (ref 70–99)
GLUCOSE SERPL-MCNC: 103 MG/DL — HIGH (ref 70–99)
HCT VFR BLD CALC: 34.1 % — LOW (ref 34.5–45)
HCT VFR BLD CALC: 34.1 % — LOW (ref 34.5–45)
HGB BLD-MCNC: 10.4 G/DL — LOW (ref 11.5–15.5)
HGB BLD-MCNC: 10.4 G/DL — LOW (ref 11.5–15.5)
IMM GRANULOCYTES # BLD AUTO: 0.02 # — SIGNIFICANT CHANGE UP
IMM GRANULOCYTES NFR BLD AUTO: 0.3 % — SIGNIFICANT CHANGE UP (ref 0–1.5)
LEVETIRACETAM SERPL-MCNC: 28.7 MCG/ML — SIGNIFICANT CHANGE UP (ref 12–46)
LYMPHOCYTES # BLD AUTO: 2.51 K/UL — SIGNIFICANT CHANGE UP (ref 1–3.3)
LYMPHOCYTES # BLD AUTO: 32.6 % — SIGNIFICANT CHANGE UP (ref 13–44)
MAGNESIUM SERPL-MCNC: 2.1 MG/DL — SIGNIFICANT CHANGE UP (ref 1.6–2.6)
MCHC RBC-ENTMCNC: 29.9 PG — SIGNIFICANT CHANGE UP (ref 27–34)
MCHC RBC-ENTMCNC: 29.9 PG — SIGNIFICANT CHANGE UP (ref 27–34)
MCHC RBC-ENTMCNC: 30.5 % — LOW (ref 32–36)
MCHC RBC-ENTMCNC: 30.5 % — LOW (ref 32–36)
MCV RBC AUTO: 98 FL — SIGNIFICANT CHANGE UP (ref 80–100)
MCV RBC AUTO: 98 FL — SIGNIFICANT CHANGE UP (ref 80–100)
MONOCYTES # BLD AUTO: 0.56 K/UL — SIGNIFICANT CHANGE UP (ref 0–0.9)
MONOCYTES NFR BLD AUTO: 7.3 % — SIGNIFICANT CHANGE UP (ref 2–14)
NEUTROPHILS # BLD AUTO: 3.36 K/UL — SIGNIFICANT CHANGE UP (ref 1.8–7.4)
NEUTROPHILS NFR BLD AUTO: 43.5 % — SIGNIFICANT CHANGE UP (ref 43–77)
NRBC # FLD: 0 — SIGNIFICANT CHANGE UP
NRBC # FLD: 0 — SIGNIFICANT CHANGE UP
PHOSPHATE SERPL-MCNC: 2.9 MG/DL — SIGNIFICANT CHANGE UP (ref 2.5–4.5)
PLATELET # BLD AUTO: 208 K/UL — SIGNIFICANT CHANGE UP (ref 150–400)
PLATELET # BLD AUTO: 208 K/UL — SIGNIFICANT CHANGE UP (ref 150–400)
PMV BLD: 11.2 FL — SIGNIFICANT CHANGE UP (ref 7–13)
PMV BLD: 11.2 FL — SIGNIFICANT CHANGE UP (ref 7–13)
POTASSIUM SERPL-MCNC: 4.4 MMOL/L — SIGNIFICANT CHANGE UP (ref 3.5–5.3)
POTASSIUM SERPL-MCNC: 4.4 MMOL/L — SIGNIFICANT CHANGE UP (ref 3.5–5.3)
POTASSIUM SERPL-SCNC: 4.4 MMOL/L — SIGNIFICANT CHANGE UP (ref 3.5–5.3)
POTASSIUM SERPL-SCNC: 4.4 MMOL/L — SIGNIFICANT CHANGE UP (ref 3.5–5.3)
RBC # BLD: 3.48 M/UL — LOW (ref 3.8–5.2)
RBC # BLD: 3.48 M/UL — LOW (ref 3.8–5.2)
RBC # FLD: 14.2 % — SIGNIFICANT CHANGE UP (ref 10.3–14.5)
RBC # FLD: 14.2 % — SIGNIFICANT CHANGE UP (ref 10.3–14.5)
SODIUM SERPL-SCNC: 145 MMOL/L — SIGNIFICANT CHANGE UP (ref 135–145)
SODIUM SERPL-SCNC: 145 MMOL/L — SIGNIFICANT CHANGE UP (ref 135–145)
WBC # BLD: 7.71 K/UL — SIGNIFICANT CHANGE UP (ref 3.8–10.5)
WBC # BLD: 7.71 K/UL — SIGNIFICANT CHANGE UP (ref 3.8–10.5)
WBC # FLD AUTO: 7.71 K/UL — SIGNIFICANT CHANGE UP (ref 3.8–10.5)
WBC # FLD AUTO: 7.71 K/UL — SIGNIFICANT CHANGE UP (ref 3.8–10.5)

## 2017-11-22 RX ORDER — SENNA PLUS 8.6 MG/1
2 TABLET ORAL AT BEDTIME
Qty: 0 | Refills: 0 | Status: DISCONTINUED | OUTPATIENT
Start: 2017-11-22 | End: 2017-11-27

## 2017-11-22 RX ORDER — DOCUSATE SODIUM 100 MG
100 CAPSULE ORAL
Qty: 0 | Refills: 0 | Status: DISCONTINUED | OUTPATIENT
Start: 2017-11-22 | End: 2017-11-27

## 2017-11-22 RX ADMIN — Medication 1 APPLICATION(S): at 21:50

## 2017-11-22 RX ADMIN — Medication 1 APPLICATION(S): at 05:54

## 2017-11-22 RX ADMIN — Medication 1: at 18:15

## 2017-11-22 RX ADMIN — ATORVASTATIN CALCIUM 40 MILLIGRAM(S): 80 TABLET, FILM COATED ORAL at 21:48

## 2017-11-22 RX ADMIN — Medication 650 MILLIGRAM(S): at 06:40

## 2017-11-22 RX ADMIN — Medication 650 MILLIGRAM(S): at 05:49

## 2017-11-22 RX ADMIN — Medication 1 DROP(S): at 05:49

## 2017-11-22 RX ADMIN — Medication 650 MILLIGRAM(S): at 18:50

## 2017-11-22 RX ADMIN — Medication 12.5 MILLIGRAM(S): at 05:49

## 2017-11-22 RX ADMIN — Medication 1 DROP(S): at 12:15

## 2017-11-22 RX ADMIN — GABAPENTIN 200 MILLIGRAM(S): 400 CAPSULE ORAL at 05:48

## 2017-11-22 RX ADMIN — INSULIN GLARGINE 14 UNIT(S): 100 INJECTION, SOLUTION SUBCUTANEOUS at 21:48

## 2017-11-22 RX ADMIN — Medication 650 MILLIGRAM(S): at 18:17

## 2017-11-22 RX ADMIN — RIVAROXABAN 15 MILLIGRAM(S): KIT at 18:13

## 2017-11-22 RX ADMIN — SENNA PLUS 2 TABLET(S): 8.6 TABLET ORAL at 21:48

## 2017-11-22 RX ADMIN — Medication 1 DROP(S): at 21:48

## 2017-11-22 RX ADMIN — Medication 50 MILLIGRAM(S): at 12:10

## 2017-11-22 RX ADMIN — GABAPENTIN 200 MILLIGRAM(S): 400 CAPSULE ORAL at 18:12

## 2017-11-22 RX ADMIN — Medication 1 APPLICATION(S): at 05:49

## 2017-11-22 RX ADMIN — Medication 12.5 MILLIGRAM(S): at 18:13

## 2017-11-22 RX ADMIN — ISOSORBIDE MONONITRATE 30 MILLIGRAM(S): 60 TABLET, EXTENDED RELEASE ORAL at 12:10

## 2017-11-22 RX ADMIN — Medication 1000 UNIT(S): at 12:09

## 2017-11-22 RX ADMIN — Medication 100 MILLIGRAM(S): at 18:12

## 2017-11-22 RX ADMIN — LEVETIRACETAM 500 MILLIGRAM(S): 250 TABLET, FILM COATED ORAL at 05:49

## 2017-11-22 RX ADMIN — LEVETIRACETAM 500 MILLIGRAM(S): 250 TABLET, FILM COATED ORAL at 18:13

## 2017-11-23 LAB
BASOPHILS # BLD AUTO: 0.09 K/UL — SIGNIFICANT CHANGE UP (ref 0–0.2)
BASOPHILS NFR BLD AUTO: 1 % — SIGNIFICANT CHANGE UP (ref 0–2)
BUN SERPL-MCNC: 35 MG/DL — HIGH (ref 7–23)
CALCIUM SERPL-MCNC: 8.7 MG/DL — SIGNIFICANT CHANGE UP (ref 8.4–10.5)
CHLORIDE SERPL-SCNC: 106 MMOL/L — SIGNIFICANT CHANGE UP (ref 98–107)
CO2 SERPL-SCNC: 26 MMOL/L — SIGNIFICANT CHANGE UP (ref 22–31)
CREAT SERPL-MCNC: 1.48 MG/DL — HIGH (ref 0.5–1.3)
EOSINOPHIL # BLD AUTO: 1.16 K/UL — HIGH (ref 0–0.5)
EOSINOPHIL NFR BLD AUTO: 13.3 % — HIGH (ref 0–6)
GLUCOSE SERPL-MCNC: 116 MG/DL — HIGH (ref 70–99)
HCT VFR BLD CALC: 33.4 % — LOW (ref 34.5–45)
HGB BLD-MCNC: 10.1 G/DL — LOW (ref 11.5–15.5)
IMM GRANULOCYTES # BLD AUTO: 0.02 # — SIGNIFICANT CHANGE UP
IMM GRANULOCYTES NFR BLD AUTO: 0.2 % — SIGNIFICANT CHANGE UP (ref 0–1.5)
LYMPHOCYTES # BLD AUTO: 2.43 K/UL — SIGNIFICANT CHANGE UP (ref 1–3.3)
LYMPHOCYTES # BLD AUTO: 27.9 % — SIGNIFICANT CHANGE UP (ref 13–44)
MAGNESIUM SERPL-MCNC: 2 MG/DL — SIGNIFICANT CHANGE UP (ref 1.6–2.6)
MCHC RBC-ENTMCNC: 29.5 PG — SIGNIFICANT CHANGE UP (ref 27–34)
MCHC RBC-ENTMCNC: 30.2 % — LOW (ref 32–36)
MCV RBC AUTO: 97.7 FL — SIGNIFICANT CHANGE UP (ref 80–100)
MONOCYTES # BLD AUTO: 0.53 K/UL — SIGNIFICANT CHANGE UP (ref 0–0.9)
MONOCYTES NFR BLD AUTO: 6.1 % — SIGNIFICANT CHANGE UP (ref 2–14)
NEUTROPHILS # BLD AUTO: 4.49 K/UL — SIGNIFICANT CHANGE UP (ref 1.8–7.4)
NEUTROPHILS NFR BLD AUTO: 51.5 % — SIGNIFICANT CHANGE UP (ref 43–77)
NRBC # FLD: 0 — SIGNIFICANT CHANGE UP
PLATELET # BLD AUTO: 241 K/UL — SIGNIFICANT CHANGE UP (ref 150–400)
PMV BLD: 11 FL — SIGNIFICANT CHANGE UP (ref 7–13)
POTASSIUM SERPL-MCNC: 4.1 MMOL/L — SIGNIFICANT CHANGE UP (ref 3.5–5.3)
POTASSIUM SERPL-SCNC: 4.1 MMOL/L — SIGNIFICANT CHANGE UP (ref 3.5–5.3)
RBC # BLD: 3.42 M/UL — LOW (ref 3.8–5.2)
RBC # FLD: 14 % — SIGNIFICANT CHANGE UP (ref 10.3–14.5)
SODIUM SERPL-SCNC: 146 MMOL/L — HIGH (ref 135–145)
SPECIMEN SOURCE: SIGNIFICANT CHANGE UP
WBC # BLD: 8.72 K/UL — SIGNIFICANT CHANGE UP (ref 3.8–10.5)
WBC # FLD AUTO: 8.72 K/UL — SIGNIFICANT CHANGE UP (ref 3.8–10.5)

## 2017-11-23 RX ORDER — SOD,AMMONIUM,POTASSIUM LACTATE
1 CREAM (GRAM) TOPICAL
Qty: 0 | Refills: 0 | Status: DISCONTINUED | OUTPATIENT
Start: 2017-11-23 | End: 2017-11-27

## 2017-11-23 RX ADMIN — GABAPENTIN 200 MILLIGRAM(S): 400 CAPSULE ORAL at 17:42

## 2017-11-23 RX ADMIN — Medication 1000 UNIT(S): at 11:33

## 2017-11-23 RX ADMIN — Medication 100 MILLIGRAM(S): at 06:22

## 2017-11-23 RX ADMIN — RIVAROXABAN 15 MILLIGRAM(S): KIT at 17:42

## 2017-11-23 RX ADMIN — LEVETIRACETAM 500 MILLIGRAM(S): 250 TABLET, FILM COATED ORAL at 06:22

## 2017-11-23 RX ADMIN — Medication 1: at 17:40

## 2017-11-23 RX ADMIN — ATORVASTATIN CALCIUM 40 MILLIGRAM(S): 80 TABLET, FILM COATED ORAL at 21:11

## 2017-11-23 RX ADMIN — ISOSORBIDE MONONITRATE 30 MILLIGRAM(S): 60 TABLET, EXTENDED RELEASE ORAL at 11:33

## 2017-11-23 RX ADMIN — Medication 1 DROP(S): at 06:22

## 2017-11-23 RX ADMIN — Medication 1 APPLICATION(S): at 18:52

## 2017-11-23 RX ADMIN — LEVETIRACETAM 500 MILLIGRAM(S): 250 TABLET, FILM COATED ORAL at 17:41

## 2017-11-23 RX ADMIN — Medication 12.5 MILLIGRAM(S): at 06:22

## 2017-11-23 RX ADMIN — GABAPENTIN 200 MILLIGRAM(S): 400 CAPSULE ORAL at 06:22

## 2017-11-23 RX ADMIN — SENNA PLUS 2 TABLET(S): 8.6 TABLET ORAL at 21:12

## 2017-11-23 RX ADMIN — Medication 50 MILLIGRAM(S): at 11:34

## 2017-11-23 RX ADMIN — Medication 1 DROP(S): at 11:34

## 2017-11-23 RX ADMIN — Medication 100 MILLIGRAM(S): at 17:41

## 2017-11-23 RX ADMIN — Medication 1 DROP(S): at 21:12

## 2017-11-23 RX ADMIN — INSULIN GLARGINE 14 UNIT(S): 100 INJECTION, SOLUTION SUBCUTANEOUS at 21:12

## 2017-11-24 ENCOUNTER — TRANSCRIPTION ENCOUNTER (OUTPATIENT)
Age: 77
End: 2017-11-24

## 2017-11-24 LAB
-  AMIKACIN: SIGNIFICANT CHANGE UP
-  AMPICILLIN/SULBACTAM: SIGNIFICANT CHANGE UP
-  AMPICILLIN: SIGNIFICANT CHANGE UP
-  AZTREONAM: SIGNIFICANT CHANGE UP
-  CEFAZOLIN: SIGNIFICANT CHANGE UP
-  CEFEPIME: SIGNIFICANT CHANGE UP
-  CEFOXITIN: SIGNIFICANT CHANGE UP
-  CEFTAZIDIME: SIGNIFICANT CHANGE UP
-  CEFTRIAXONE: SIGNIFICANT CHANGE UP
-  CIPROFLOXACIN: SIGNIFICANT CHANGE UP
-  ERTAPENEM: SIGNIFICANT CHANGE UP
-  GENTAMICIN: SIGNIFICANT CHANGE UP
-  IMIPENEM: SIGNIFICANT CHANGE UP
-  LEVOFLOXACIN: SIGNIFICANT CHANGE UP
-  MEROPENEM: SIGNIFICANT CHANGE UP
-  NITROFURANTOIN: SIGNIFICANT CHANGE UP
-  PIPERACILLIN/TAZOBACTAM: SIGNIFICANT CHANGE UP
-  TIGECYCLINE: SIGNIFICANT CHANGE UP
-  TOBRAMYCIN: SIGNIFICANT CHANGE UP
-  TRIMETHOPRIM/SULFAMETHOXAZOLE: SIGNIFICANT CHANGE UP
BACTERIA UR CULT: SIGNIFICANT CHANGE UP
BUN SERPL-MCNC: 36 MG/DL — HIGH (ref 7–23)
CALCIUM SERPL-MCNC: 9 MG/DL — SIGNIFICANT CHANGE UP (ref 8.4–10.5)
CHLORIDE SERPL-SCNC: 106 MMOL/L — SIGNIFICANT CHANGE UP (ref 98–107)
CO2 SERPL-SCNC: 27 MMOL/L — SIGNIFICANT CHANGE UP (ref 22–31)
CREAT SERPL-MCNC: 1.61 MG/DL — HIGH (ref 0.5–1.3)
GLUCOSE SERPL-MCNC: 139 MG/DL — HIGH (ref 70–99)
HCT VFR BLD CALC: 35.4 % — SIGNIFICANT CHANGE UP (ref 34.5–45)
HGB BLD-MCNC: 10.5 G/DL — LOW (ref 11.5–15.5)
LEVETIRACETAM SERPL-MCNC: 28.5 MCG/ML — SIGNIFICANT CHANGE UP (ref 12–46)
MCHC RBC-ENTMCNC: 29.3 PG — SIGNIFICANT CHANGE UP (ref 27–34)
MCHC RBC-ENTMCNC: 29.7 % — LOW (ref 32–36)
MCV RBC AUTO: 98.9 FL — SIGNIFICANT CHANGE UP (ref 80–100)
METHOD TYPE: SIGNIFICANT CHANGE UP
NRBC # FLD: 0 — SIGNIFICANT CHANGE UP
ORGANISM # SPEC MICROSCOPIC CNT: SIGNIFICANT CHANGE UP
ORGANISM # SPEC MICROSCOPIC CNT: SIGNIFICANT CHANGE UP
PLATELET # BLD AUTO: 289 K/UL — SIGNIFICANT CHANGE UP (ref 150–400)
PMV BLD: 9.9 FL — SIGNIFICANT CHANGE UP (ref 7–13)
POTASSIUM SERPL-MCNC: 4.2 MMOL/L — SIGNIFICANT CHANGE UP (ref 3.5–5.3)
POTASSIUM SERPL-SCNC: 4.2 MMOL/L — SIGNIFICANT CHANGE UP (ref 3.5–5.3)
RBC # BLD: 3.58 M/UL — LOW (ref 3.8–5.2)
RBC # FLD: 13.9 % — SIGNIFICANT CHANGE UP (ref 10.3–14.5)
SODIUM SERPL-SCNC: 143 MMOL/L — SIGNIFICANT CHANGE UP (ref 135–145)
WBC # BLD: 8.51 K/UL — SIGNIFICANT CHANGE UP (ref 3.8–10.5)
WBC # FLD AUTO: 8.51 K/UL — SIGNIFICANT CHANGE UP (ref 3.8–10.5)

## 2017-11-24 PROCEDURE — 93306 TTE W/DOPPLER COMPLETE: CPT | Mod: 26

## 2017-11-24 RX ADMIN — Medication 100 MILLIGRAM(S): at 05:41

## 2017-11-24 RX ADMIN — Medication 1 DROP(S): at 05:42

## 2017-11-24 RX ADMIN — GABAPENTIN 200 MILLIGRAM(S): 400 CAPSULE ORAL at 17:46

## 2017-11-24 RX ADMIN — SENNA PLUS 2 TABLET(S): 8.6 TABLET ORAL at 21:35

## 2017-11-24 RX ADMIN — Medication 1 DROP(S): at 14:00

## 2017-11-24 RX ADMIN — Medication 650 MILLIGRAM(S): at 21:44

## 2017-11-24 RX ADMIN — Medication 1 APPLICATION(S): at 03:57

## 2017-11-24 RX ADMIN — Medication 12.5 MILLIGRAM(S): at 17:47

## 2017-11-24 RX ADMIN — Medication 50 MILLIGRAM(S): at 14:00

## 2017-11-24 RX ADMIN — Medication 1 APPLICATION(S): at 05:41

## 2017-11-24 RX ADMIN — LEVETIRACETAM 500 MILLIGRAM(S): 250 TABLET, FILM COATED ORAL at 05:41

## 2017-11-24 RX ADMIN — INSULIN GLARGINE 14 UNIT(S): 100 INJECTION, SOLUTION SUBCUTANEOUS at 22:08

## 2017-11-24 RX ADMIN — RIVAROXABAN 15 MILLIGRAM(S): KIT at 17:46

## 2017-11-24 RX ADMIN — Medication 1 APPLICATION(S): at 17:46

## 2017-11-24 RX ADMIN — ATORVASTATIN CALCIUM 40 MILLIGRAM(S): 80 TABLET, FILM COATED ORAL at 21:35

## 2017-11-24 RX ADMIN — Medication 650 MILLIGRAM(S): at 22:30

## 2017-11-24 RX ADMIN — Medication 1 DROP(S): at 21:35

## 2017-11-24 RX ADMIN — Medication 325 MILLIGRAM(S): at 14:00

## 2017-11-24 RX ADMIN — GABAPENTIN 200 MILLIGRAM(S): 400 CAPSULE ORAL at 05:41

## 2017-11-24 RX ADMIN — ISOSORBIDE MONONITRATE 30 MILLIGRAM(S): 60 TABLET, EXTENDED RELEASE ORAL at 14:00

## 2017-11-24 RX ADMIN — Medication 100 MILLIGRAM(S): at 17:46

## 2017-11-24 RX ADMIN — Medication 12.5 MILLIGRAM(S): at 05:41

## 2017-11-24 RX ADMIN — Medication 1000 UNIT(S): at 14:00

## 2017-11-24 RX ADMIN — LEVETIRACETAM 500 MILLIGRAM(S): 250 TABLET, FILM COATED ORAL at 17:46

## 2017-11-24 NOTE — DISCHARGE NOTE ADULT - PROVIDER TOKENS
FREE:[LAST:[Sammi],PHONE:[(   )    -],FAX:[(   )    -],ADDRESS:[PRIMARY CARE PROVIDER]],TOKEN:'[id number]'

## 2017-11-24 NOTE — DISCHARGE NOTE ADULT - CARE PROVIDER_API CALL
Sammi,   PRIMARY CARE PROVIDER  Phone: (   )    -  Fax: (   )    -    Fernando Pierce (MD), Internal Medicine  [address] [address] [place] [address]  Phone: [phone]  Fax: [phone]

## 2017-11-24 NOTE — DISCHARGE NOTE ADULT - MEDICATION SUMMARY - MEDICATIONS TO STOP TAKING
I will STOP taking the medications listed below when I get home from the hospital:    hydroCHLOROthiazide 12.5 mg oral capsule  -- 1 cap(s) by mouth once a day

## 2017-11-24 NOTE — DISCHARGE NOTE ADULT - SECONDARY DIAGNOSIS.
Conjunctivitis History of CVA (cerebrovascular accident) Paroxysmal a-fib Diabetes mellitus Hypertension HLD (hyperlipidemia)

## 2017-11-24 NOTE — DISCHARGE NOTE ADULT - MEDICATION SUMMARY - MEDICATIONS TO TAKE
I will START or STAY ON the medications listed below when I get home from the hospital:    Tylenol 325 mg oral tablet  -- 2 tab(s) by mouth every 4 hours, As Needed - for muscle spasm, for moderate pain  -- Indication: For As needed for pain    lisinopril 2.5 mg oral tablet  -- 1 tab(s) by mouth once a day  -- Indication: For High blood pressure    isosorbide mononitrate 30 mg oral tablet, extended release  -- 1 tab(s) by mouth once a day (in the morning)  -- Indication: For High blood pressure    Xarelto 15 mg oral tablet  -- 1 tab(s) by mouth once a day (in the evening)  -- Indication: For Afib    Keppra 500 mg oral tablet  -- 1 tab(s) by mouth 2 times a day  -- Indication: For Seizure    gabapentin 100 mg oral capsule  -- 2 cap(s) by mouth 2 times a day  -- Indication: For Neuropathy    traZODone 50 mg oral tablet  -- 1 tab(s) by mouth once a day  -- Indication: For Depression    Lantus 100 units/mL subcutaneous solution  -- 14 unit(s) subcutaneous once a day (at bedtime)  -- Indication: For Diabetes mellitus    metFORMIN 1000 mg oral tablet  -- 1 tab(s) by mouth 2 times a day  -- Indication: For Diabetes mellitus    HumaLOG 100 units/mL subcutaneous solution  -- Sliding scale  -- Indication: For Diabetes mellitus    Crestor 10 mg oral tablet  -- 1 tab(s) by mouth once a day (at bedtime)  -- Indication: For High cholesterol    metoprolol  -- 12.5 milligram(s) by mouth 2 times a day  -- Indication: For High blood pressure    ammonium lactate 12% topical lotion  -- 1 application on skin 2 times a day  -- Indication: For Skin rash    Amitiza 24 mcg oral capsule  -- 1 cap(s) by mouth 2 times a day  -- Indication: For IBS    ferrous sulfate 325 mg (65 mg elemental iron) oral delayed release tablet  -- 1 tab(s) by mouth once a day  -- Indication: For Iron supplement    Metamucil 3.4 g/5.2 g oral powder for reconstitution  -- Indication: For Constipation    simethicone 80 mg oral tablet  -- 1 tab(s) by mouth 2 times a day  -- Indication: For Indigestion    Artificial Tears ophthalmic solution  -- 1 drop(s) to each affected eye 2 times a day  -- Indication: For Dry eyes     tobramycin 0.3% ophthalmic solution  -- 1 drop(s) to each affected eye 3 times a day  -- Indication: For Conjunctivitis    Vitamin D3 1000 intl units oral tablet  -- 1 tab(s) by mouth once a day  -- Indication: For Supplement

## 2017-11-24 NOTE — DISCHARGE NOTE ADULT - PATIENT PORTAL LINK FT
“You can access the [facility] Patient Portal, offered by [facility], by registering with the following website: [email or web address]”

## 2017-11-24 NOTE — DISCHARGE NOTE ADULT - CARE PLAN
Principal Discharge DX:	Syncope  Goal:	Prevent future episodes  Instructions for follow-up, activity and diet:	Stay well hydrated during the day. Follow up with your primary care physician for further monitoring in 1-2 weeks. Please call to arrange appointment.  Secondary Diagnosis:	Paroxysmal a-fib  Instructions for follow-up, activity and diet:	Continue metoprolol and Xeralto.  Follow up with your primary care physician for further monitoring in 1-2 weeks. Please call to arrange appointment.  Secondary Diagnosis:	Diabetes mellitus  Instructions for follow-up, activity and diet:	Continue medications as prescribed.   Continue diet modification. Avoid complex carbohydrates such as bread, pasta, cereal, white rice, white potatoes, etc. Avoid concentrated sugar as found in desserts, candy, soda, juice, etc. Consume a diet based on lean protein (chicken, fish) and vegetables.  Follow up with your primary care physician for further monitoring in 1-2 weeks. Please call to arrange appointment.  Secondary Diagnosis:	Hypertension  Goal:	Ensure medication compliance to maintain BP<140/90  Instructions for follow-up, activity and diet:	Continue metoprolol  Follow up with your primary care physician for further monitoring in 1-2 weeks. Please call to arrange appointment.  Secondary Diagnosis:	Conjunctivitis  Instructions for follow-up, activity and diet:	Continue tobramycin as prescribed.  Secondary Diagnosis:	History of CVA (cerebrovascular accident)  Instructions for follow-up, activity and diet:	Continue gabapentin and levetiracetam. Principal Discharge DX:	Syncope  Goal:	Prevent future episodes  Instructions for follow-up, activity and diet:	Stay well hydrated during the day and eat a well balanced diet. Follow up with your primary care physician and cardiology for further monitoring in 1-2 weeks. Please call to arrange appointments.  Continue medications as prescribed.  Secondary Diagnosis:	Paroxysmal a-fib  Instructions for follow-up, activity and diet:	Continue metoprolol and Xarelto.  Follow up with your primary care physician and cardiology for further monitoring in 1-2 weeks. Please call to arrange appointments.  Secondary Diagnosis:	Diabetes mellitus  Instructions for follow-up, activity and diet:	Continue medications as prescribed.   Continue diet modification. Avoid complex carbohydrates such as bread, pasta, cereal, white rice, white potatoes, etc. Avoid concentrated sugar as found in desserts, candy, soda, juice, etc. Consume a diet based on lean protein (chicken, fish) and vegetables.  Follow up with your primary care physician for further monitoring in 1-2 weeks. Please call to arrange appointment.  You should have yearly routine eye and foot examinations.  Check your HgA1C blood test every 3 months.  Secondary Diagnosis:	Hypertension  Goal:	Ensure medication compliance to maintain BP<140/90  Instructions for follow-up, activity and diet:	Continue metoprolol and lisinopril as prescribed.   Follow up with your primary care physician and cardiology for further monitoring in 1-2 weeks. Please call to arrange appointments.  Low salt diet.  Secondary Diagnosis:	Conjunctivitis  Instructions for follow-up, activity and diet:	Continue tobramycin as prescribed.  Secondary Diagnosis:	History of CVA (cerebrovascular accident)  Instructions for follow-up, activity and diet:	Continue medications as prescribed.  Low salt, low fat, low sugar diet.  Follow up with your PCP within 1-2 weeks.  Secondary Diagnosis:	HLD (hyperlipidemia)  Instructions for follow-up, activity and diet:	Continue lipitor as prescribed.  Low fat diet.  Follow up with your PCP. Principal Discharge DX:	Syncope  Goal:	Prevent future episodes  Instructions for follow-up, activity and diet:	Stay well hydrated during the day and eat a well balanced diet. Follow up with your primary care physician and cardiology for further monitoring in 1-2 weeks. Please call to arrange appointments.  Continue medications as prescribed.  Secondary Diagnosis:	Paroxysmal a-fib  Instructions for follow-up, activity and diet:	Continue metoprolol and Xarelto.  Follow up with your primary care physician and cardiology for further monitoring in 1-2 weeks. Please call to arrange appointments.  Secondary Diagnosis:	Diabetes mellitus  Instructions for follow-up, activity and diet:	Continue medications as prescribed.   Continue diet modification. Avoid complex carbohydrates such as bread, pasta, cereal, white rice, white potatoes, etc. Avoid concentrated sugar as found in desserts, candy, soda, juice, etc. Consume a diet based on lean protein (chicken, fish) and vegetables.  Follow up with your primary care physician for further monitoring in 1-2 weeks. Please call to arrange appointment.  You should have yearly routine eye and foot examinations.  Check your HgA1C blood test every 3 months.  Secondary Diagnosis:	Hypertension  Goal:	Ensure medication compliance to maintain BP<140/90  Instructions for follow-up, activity and diet:	Continue metoprolol and lisinopril as prescribed.   Follow up with your primary care physician and cardiology for further monitoring in 1-2 weeks. Please call to arrange appointments.  Low salt diet.  Secondary Diagnosis:	Conjunctivitis  Instructions for follow-up, activity and diet:	Continue tobramycin as prescribed.  Secondary Diagnosis:	History of CVA (cerebrovascular accident)  Instructions for follow-up, activity and diet:	Continue medications as prescribed.  Low salt, low fat, low sugar diet.  Follow up with your PCP within 1-2 weeks.  Secondary Diagnosis:	HLD (hyperlipidemia)  Instructions for follow-up, activity and diet:	Continue crestor as prescribed.  Low fat diet.  Follow up with your PCP.

## 2017-11-24 NOTE — DISCHARGE NOTE ADULT - PLAN OF CARE
Continue metoprolol  Follow up with your primary care physician for further monitoring in 1-2 weeks. Please call to arrange appointment. Continue tobramycin as prescribed. Continue gabapentin and levetiracetam. Prevent future episodes Stay well hydrated during the day. Follow up with your primary care physician for further monitoring in 1-2 weeks. Please call to arrange appointment. Continue metoprolol and Xeralto.  Follow up with your primary care physician for further monitoring in 1-2 weeks. Please call to arrange appointment. Continue medications as prescribed.   Continue diet modification. Avoid complex carbohydrates such as bread, pasta, cereal, white rice, white potatoes, etc. Avoid concentrated sugar as found in desserts, candy, soda, juice, etc. Consume a diet based on lean protein (chicken, fish) and vegetables.  Follow up with your primary care physician for further monitoring in 1-2 weeks. Please call to arrange appointment. Ensure medication compliance to maintain BP<140/90 Stay well hydrated during the day and eat a well balanced diet. Follow up with your primary care physician and cardiology for further monitoring in 1-2 weeks. Please call to arrange appointments.  Continue medications as prescribed. Continue metoprolol and Xarelto.  Follow up with your primary care physician and cardiology for further monitoring in 1-2 weeks. Please call to arrange appointments. Continue medications as prescribed.   Continue diet modification. Avoid complex carbohydrates such as bread, pasta, cereal, white rice, white potatoes, etc. Avoid concentrated sugar as found in desserts, candy, soda, juice, etc. Consume a diet based on lean protein (chicken, fish) and vegetables.  Follow up with your primary care physician for further monitoring in 1-2 weeks. Please call to arrange appointment.  You should have yearly routine eye and foot examinations.  Check your HgA1C blood test every 3 months. Continue metoprolol and lisinopril as prescribed.   Follow up with your primary care physician and cardiology for further monitoring in 1-2 weeks. Please call to arrange appointments.  Low salt diet. Continue medications as prescribed.  Low salt, low fat, low sugar diet.  Follow up with your PCP within 1-2 weeks. Continue lipitor as prescribed.  Low fat diet.  Follow up with your PCP. Continue crestor as prescribed.  Low fat diet.  Follow up with your PCP.

## 2017-11-24 NOTE — DISCHARGE NOTE ADULT - HOSPITAL COURSE
HPI:  78 y/o F with PMH of Obesity, HTN, DM, CVA(with left sided residuals), HLD, Depression, Asthma, ?Possible atrial fibrillation(on Xarelto) presented with the complaint of syncope while at [facility] for a . As per the patient she was in her usual state of health and was at a  yesterday when she had an episode of syncope. Patient stated that she was sitting in the wheelchair and then felt "warm all over" and then remembers people around her trying to wake her up. Patient is unsure how long she had LOC, and when she woke up she knew where she was but was "a little confused and not making sense at that time". Patient stated that prior to passing out she felt lightheadedness and dizziness. Patient does not think she had any head trauma or ay falls. Patient unsure if she had any seizure like activity, tongue laceration, or any bowel or bladder incontinence. Patient denied any headaches, changes in vision, CP, fevers, chills, N/V/D/C, abdominal pain, dysuria, cough, melena, hematochezia, recent travel, sick contact, pleuritic or positional chest pain.      On ED admission EKG revealed EKG: Atrial fibrillation at a rate of 86 with QTc of 440, CE x 1: Negative, BUN/Cr: 30/1.53.   CXR: Clear lungs  CT head: No acute intracranial hemorrhage or mass effect.  Negative orthostatics     Cardiology: Patient has ruled out for acute coronary syndrome. No ventricular ectopy or bradycardia on telemetry thus far. Paroxysmal A-fib- Patient continues to be in sinus on telemetry.   Continue Xarelto and statin for h/o CVA. 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   DC planning pending echo     TTE: Technically very difficult study.  1. Mitral annular calcification, otherwise normal mitral valve. Minimal mitral regurgitation.  2. Endocardium not well visualized; grossly preserved overall left ventricular systolic function.  Unable to exclude segmental wall motion abnormalities.  Endocardial visualization enhanced with intravenous injection of echo contrast (Definity).  3. Unable to accurately evaluate right ventricular size or  systolic function.    INCOMPLETE** HPI:  76 y/o F with PMH of Obesity, HTN, DM, CVA(with left sided residuals), HLD, Depression, Asthma, ?Possible atrial fibrillation(on Xarelto) presented with the complaint of syncope while at [facility] for a . As per the patient she was in her usual state of health and was at a  yesterday when she had an episode of syncope. Patient stated that she was sitting in the wheelchair and then felt "warm all over" and then remembers people around her trying to wake her up. Patient is unsure how long she had LOC, and when she woke up she knew where she was but was "a little confused and not making sense at that time". Patient stated that prior to passing out she felt lightheadedness and dizziness. Patient does not think she had any head trauma or ay falls. Patient unsure if she had any seizure like activity, tongue laceration, or any bowel or bladder incontinence. Patient denied any headaches, changes in vision, CP, fevers, chills, N/V/D/C, abdominal pain, dysuria, cough, melena, hematochezia, recent travel, sick contact, pleuritic or positional chest pain.      On ED admission EKG revealed EKG: Atrial fibrillation at a rate of 86 with QTc of 440, CE x 1: Negative, BUN/Cr: 30/1.53.   CXR: Clear lungs  CT head: No acute intracranial hemorrhage or mass effect.  Negative orthostatics     Cardiology: Patient has ruled out for acute coronary syndrome. No ventricular ectopy or bradycardia on telemetry thus far. Paroxysmal A-fib- Patient continues to be in sinus on telemetry.   Continue Xarelto and statin for h/o CVA. 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     Patient evaluated by cardiology: 77 year old woman with a-fib on Xarelto, prior R corpus callosum CVA in  now wheelchair bound, HTN and known RV dysfunction presents with syncope. Patient has ruled out for acute coronary syndrome. No ventricular ectopy or bradycardia on telemetry thus far. TTE reviewed- no significant structural heart disease noted.  Paroxysmal A-fib- Patient continues to be in sinus on telemetry. Continue Xarelto and statin for h/o CVA. HTN- BP controlled on current regimen. Resume home dose lisinopril 2.5mg daily, as NIMA resolved. No further inpatient cardiac work up warranted at this time.      TTE: Technically very difficult study.  1. Mitral annular calcification, otherwise normal mitral valve. Minimal mitral regurgitation.  2. Endocardium not well visualized; grossly preserved overall left ventricular systolic function.  Unable to exclude segmental wall motion abnormalities.  Endocardial visualization enhanced with intravenous injection of echo contrast (Definity).  3. Unable to accurately evaluate right ventricular size or  systolic function.    Patient cleared for discharge back to NH.  Outpatient follow up with PCP and cardiology.

## 2017-11-24 NOTE — DISCHARGE NOTE ADULT - MEDICATION SUMMARY - MEDICATIONS TO CHANGE
I will SWITCH the dose or number of times a day I take the medications listed below when I get home from the hospital:    Neurontin 300 mg oral capsule  -- 1 cap(s) by mouth 4 times a day

## 2017-11-24 NOTE — DISCHARGE NOTE ADULT - COMMUNITY RESOURCES
[facility] address: [address] [facility] tel # [phone], 3pm  via [facility] tel # [phone] [facility] address: [address] [facility] tel # [phone], 4:30pm  via [facility] tel # [phone]

## 2017-11-25 LAB
BUN SERPL-MCNC: 37 MG/DL — HIGH (ref 7–23)
CALCIUM SERPL-MCNC: 9 MG/DL — SIGNIFICANT CHANGE UP (ref 8.4–10.5)
CHLORIDE SERPL-SCNC: 106 MMOL/L — SIGNIFICANT CHANGE UP (ref 98–107)
CO2 SERPL-SCNC: 28 MMOL/L — SIGNIFICANT CHANGE UP (ref 22–31)
CREAT SERPL-MCNC: 1.37 MG/DL — HIGH (ref 0.5–1.3)
GLUCOSE SERPL-MCNC: 115 MG/DL — HIGH (ref 70–99)
HCT VFR BLD CALC: 35.7 % — SIGNIFICANT CHANGE UP (ref 34.5–45)
HGB BLD-MCNC: 10.4 G/DL — LOW (ref 11.5–15.5)
MAGNESIUM SERPL-MCNC: 1.9 MG/DL — SIGNIFICANT CHANGE UP (ref 1.6–2.6)
MCHC RBC-ENTMCNC: 29.1 % — LOW (ref 32–36)
MCHC RBC-ENTMCNC: 29.2 PG — SIGNIFICANT CHANGE UP (ref 27–34)
MCV RBC AUTO: 100.3 FL — HIGH (ref 80–100)
NRBC # FLD: 0 — SIGNIFICANT CHANGE UP
PLATELET # BLD AUTO: 257 K/UL — SIGNIFICANT CHANGE UP (ref 150–400)
PMV BLD: 10 FL — SIGNIFICANT CHANGE UP (ref 7–13)
POTASSIUM SERPL-MCNC: 4.1 MMOL/L — SIGNIFICANT CHANGE UP (ref 3.5–5.3)
POTASSIUM SERPL-SCNC: 4.1 MMOL/L — SIGNIFICANT CHANGE UP (ref 3.5–5.3)
RBC # BLD: 3.56 M/UL — LOW (ref 3.8–5.2)
RBC # FLD: 13.7 % — SIGNIFICANT CHANGE UP (ref 10.3–14.5)
SODIUM SERPL-SCNC: 144 MMOL/L — SIGNIFICANT CHANGE UP (ref 135–145)
WBC # BLD: 7.93 K/UL — SIGNIFICANT CHANGE UP (ref 3.8–10.5)
WBC # FLD AUTO: 7.93 K/UL — SIGNIFICANT CHANGE UP (ref 3.8–10.5)

## 2017-11-25 RX ADMIN — Medication 1 DROP(S): at 05:21

## 2017-11-25 RX ADMIN — INSULIN GLARGINE 14 UNIT(S): 100 INJECTION, SOLUTION SUBCUTANEOUS at 22:02

## 2017-11-25 RX ADMIN — Medication 12.5 MILLIGRAM(S): at 05:21

## 2017-11-25 RX ADMIN — Medication 50 MILLIGRAM(S): at 12:53

## 2017-11-25 RX ADMIN — Medication 1 DROP(S): at 13:01

## 2017-11-25 RX ADMIN — ATORVASTATIN CALCIUM 40 MILLIGRAM(S): 80 TABLET, FILM COATED ORAL at 22:02

## 2017-11-25 RX ADMIN — Medication 100 MILLIGRAM(S): at 18:27

## 2017-11-25 RX ADMIN — Medication 325 MILLIGRAM(S): at 12:53

## 2017-11-25 RX ADMIN — Medication 650 MILLIGRAM(S): at 14:53

## 2017-11-25 RX ADMIN — Medication 650 MILLIGRAM(S): at 19:09

## 2017-11-25 RX ADMIN — Medication 12.5 MILLIGRAM(S): at 18:26

## 2017-11-25 RX ADMIN — RIVAROXABAN 15 MILLIGRAM(S): KIT at 18:27

## 2017-11-25 RX ADMIN — LEVETIRACETAM 500 MILLIGRAM(S): 250 TABLET, FILM COATED ORAL at 05:21

## 2017-11-25 RX ADMIN — GABAPENTIN 200 MILLIGRAM(S): 400 CAPSULE ORAL at 05:21

## 2017-11-25 RX ADMIN — LEVETIRACETAM 500 MILLIGRAM(S): 250 TABLET, FILM COATED ORAL at 18:27

## 2017-11-25 RX ADMIN — GABAPENTIN 200 MILLIGRAM(S): 400 CAPSULE ORAL at 18:27

## 2017-11-25 RX ADMIN — Medication 650 MILLIGRAM(S): at 23:15

## 2017-11-25 RX ADMIN — SENNA PLUS 2 TABLET(S): 8.6 TABLET ORAL at 22:02

## 2017-11-25 RX ADMIN — Medication 100 MILLIGRAM(S): at 05:21

## 2017-11-25 RX ADMIN — Medication 1 DROP(S): at 22:03

## 2017-11-25 RX ADMIN — Medication 1: at 12:53

## 2017-11-25 RX ADMIN — Medication 1 APPLICATION(S): at 18:25

## 2017-11-25 RX ADMIN — ISOSORBIDE MONONITRATE 30 MILLIGRAM(S): 60 TABLET, EXTENDED RELEASE ORAL at 12:53

## 2017-11-25 RX ADMIN — Medication 1 APPLICATION(S): at 05:22

## 2017-11-25 RX ADMIN — Medication 1000 UNIT(S): at 12:53

## 2017-11-25 RX ADMIN — Medication 650 MILLIGRAM(S): at 22:03

## 2017-11-26 RX ADMIN — Medication 1 APPLICATION(S): at 05:09

## 2017-11-26 RX ADMIN — GABAPENTIN 200 MILLIGRAM(S): 400 CAPSULE ORAL at 18:22

## 2017-11-26 RX ADMIN — Medication 50 MILLIGRAM(S): at 14:18

## 2017-11-26 RX ADMIN — RIVAROXABAN 15 MILLIGRAM(S): KIT at 18:22

## 2017-11-26 RX ADMIN — Medication 1000 UNIT(S): at 14:18

## 2017-11-26 RX ADMIN — LEVETIRACETAM 500 MILLIGRAM(S): 250 TABLET, FILM COATED ORAL at 05:09

## 2017-11-26 RX ADMIN — Medication 100 MILLIGRAM(S): at 18:22

## 2017-11-26 RX ADMIN — Medication 12.5 MILLIGRAM(S): at 18:22

## 2017-11-26 RX ADMIN — Medication 1 APPLICATION(S): at 18:23

## 2017-11-26 RX ADMIN — Medication 3: at 18:21

## 2017-11-26 RX ADMIN — Medication 1 DROP(S): at 14:18

## 2017-11-26 RX ADMIN — ISOSORBIDE MONONITRATE 30 MILLIGRAM(S): 60 TABLET, EXTENDED RELEASE ORAL at 14:18

## 2017-11-26 RX ADMIN — Medication 1 DROP(S): at 22:05

## 2017-11-26 RX ADMIN — Medication 100 MILLIGRAM(S): at 05:09

## 2017-11-26 RX ADMIN — INSULIN GLARGINE 14 UNIT(S): 100 INJECTION, SOLUTION SUBCUTANEOUS at 22:05

## 2017-11-26 RX ADMIN — LEVETIRACETAM 500 MILLIGRAM(S): 250 TABLET, FILM COATED ORAL at 18:22

## 2017-11-26 RX ADMIN — ATORVASTATIN CALCIUM 40 MILLIGRAM(S): 80 TABLET, FILM COATED ORAL at 22:05

## 2017-11-26 RX ADMIN — GABAPENTIN 200 MILLIGRAM(S): 400 CAPSULE ORAL at 05:09

## 2017-11-26 RX ADMIN — Medication 1 APPLICATION(S): at 22:44

## 2017-11-26 RX ADMIN — Medication 1 DROP(S): at 05:09

## 2017-11-26 RX ADMIN — SENNA PLUS 2 TABLET(S): 8.6 TABLET ORAL at 22:05

## 2017-11-26 NOTE — PROGRESS NOTE ADULT - ASSESSMENT
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    #Syncope-  no further syncopal events, f/u echo          #Paroxysmal A-fib-   Patient continues to be in sinus on telemetry.   Continue Xarelto and statin for h/o CVA    #HTN- BP controlled on current regimen.    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    #Syncope-   Patient has ruled out for acute coronary syndrome.  No ventricular ectopy or bradycardia on telemetry thus far.  Admit to telemetry and obtain TTE to assess for valvulopathy (known RV dysfunction).    #A-fib- rate controlled.   Continue Xarelto and statin for h/o CVA    #HTN- BP controlled on current regimen.
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    #Syncope-  no further syncopal events, echo - pending          #Paroxysmal A-fib-   Patient continues to be in sinus on telemetry.   Continue Xarelto and statin for h/o CVA    #HTN- BP controlled on current regimen.  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    #Syncope-   Patient has ruled out for acute coronary syndrome.  No ventricular ectopy or bradycardia on telemetry thus far.  Admit to telemetry and obtain TTE to assess for valvulopathy (known RV dysfunction).    #A-fib- rate controlled.   Continue Xarelto and statin for h/o CVA    #HTN- BP controlled on current regimen.
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    #Syncope-  no further syncopal events, f/u echo          #Paroxysmal A-fib-   Patient continues to be in sinus on telemetry.   Continue Xarelto and statin for h/o CVA    #HTN- BP controlled on current regimen.    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    #Syncope-   Patient has ruled out for acute coronary syndrome.  No ventricular ectopy or bradycardia on telemetry thus far.  Admit to telemetry and obtain TTE to assess for valvulopathy (known RV dysfunction).    #A-fib- rate controlled.   Continue Xarelto and statin for h/o CVA    #HTN- BP controlled on current regimen.
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    #Syncope-  no further syncopal events, f/u echo          #Paroxysmal A-fib-   Patient continues to be in sinus on telemetry.   Continue Xarelto and statin for h/o CVA    #HTN- BP controlled on current regimen.    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    #Syncope-   Patient has ruled out for acute coronary syndrome.  No ventricular ectopy or bradycardia on telemetry thus far.  Admit to telemetry and obtain TTE to assess for valvulopathy (known RV dysfunction).    #A-fib- rate controlled.   Continue Xarelto and statin for h/o CVA    #HTN- BP controlled on current regimen.

## 2017-11-27 VITALS — SYSTOLIC BLOOD PRESSURE: 134 MMHG | DIASTOLIC BLOOD PRESSURE: 72 MMHG | HEART RATE: 82 BPM

## 2017-11-27 RX ORDER — GABAPENTIN 400 MG/1
1 CAPSULE ORAL
Qty: 0 | Refills: 0 | COMMUNITY

## 2017-11-27 RX ORDER — LISINOPRIL 2.5 MG/1
2.5 TABLET ORAL DAILY
Qty: 0 | Refills: 0 | Status: DISCONTINUED | OUTPATIENT
Start: 2017-11-27 | End: 2017-11-27

## 2017-11-27 RX ORDER — GABAPENTIN 400 MG/1
2 CAPSULE ORAL
Qty: 0 | Refills: 0 | COMMUNITY
Start: 2017-11-27

## 2017-11-27 RX ORDER — SOD,AMMONIUM,POTASSIUM LACTATE
1 CREAM (GRAM) TOPICAL
Qty: 0 | Refills: 0 | COMMUNITY
Start: 2017-11-27

## 2017-11-27 RX ADMIN — Medication 50 MILLIGRAM(S): at 15:12

## 2017-11-27 RX ADMIN — GABAPENTIN 200 MILLIGRAM(S): 400 CAPSULE ORAL at 05:28

## 2017-11-27 RX ADMIN — Medication 100 MILLIGRAM(S): at 05:28

## 2017-11-27 RX ADMIN — Medication 1 APPLICATION(S): at 05:28

## 2017-11-27 RX ADMIN — ISOSORBIDE MONONITRATE 30 MILLIGRAM(S): 60 TABLET, EXTENDED RELEASE ORAL at 15:12

## 2017-11-27 RX ADMIN — Medication 12.5 MILLIGRAM(S): at 05:28

## 2017-11-27 RX ADMIN — Medication 1 DROP(S): at 05:28

## 2017-11-27 RX ADMIN — Medication 1 DROP(S): at 15:12

## 2017-11-27 RX ADMIN — LEVETIRACETAM 500 MILLIGRAM(S): 250 TABLET, FILM COATED ORAL at 05:28

## 2017-11-27 NOTE — PROGRESS NOTE ADULT - SUBJECTIVE AND OBJECTIVE BOX
SUBJECTIVE / OVERNIGHT EVENTS: pt denies chest pain, shortness of breath, nausea , v    MEDICATIONS  (STANDING):  ammonium lactate 12% Lotion 1 Application(s) Topical two times a day  atorvastatin 40 milliGRAM(s) Oral at bedtime  cholecalciferol 1000 Unit(s) Oral daily  dextrose 5%. 1000 milliLiter(s) (50 mL/Hr) IV Continuous <Continuous>  dextrose 50% Injectable 12.5 Gram(s) IV Push once  dextrose 50% Injectable 25 Gram(s) IV Push once  dextrose 50% Injectable 25 Gram(s) IV Push once  docusate sodium 100 milliGRAM(s) Oral two times a day  ferrous    sulfate 325 milliGRAM(s) Oral daily  gabapentin 200 milliGRAM(s) Oral two times a day  insulin glargine Injectable (LANTUS) 14 Unit(s) SubCutaneous at bedtime  insulin lispro (HumaLOG) corrective regimen sliding scale   SubCutaneous three times a day before meals  insulin lispro (HumaLOG) corrective regimen sliding scale   SubCutaneous at bedtime  isosorbide   mononitrate ER Tablet (IMDUR) 30 milliGRAM(s) Oral daily  levETIRAcetam 500 milliGRAM(s) Oral two times a day  metoprolol     tartrate 12.5 milliGRAM(s) Oral two times a day  rivaroxaban 15 milliGRAM(s) Oral every 24 hours  senna 2 Tablet(s) Oral at bedtime  sodium chloride 0.9%. 1000 milliLiter(s) (50 mL/Hr) IV Continuous <Continuous>  tobramycin 0.3% Solution 1 Drop(s) Right EYE three times a day  traZODone 50 milliGRAM(s) Oral daily    MEDICATIONS  (PRN):  acetaminophen   Tablet. 650 milliGRAM(s) Oral every 6 hours PRN Moderate Pain (4 - 6)  dextrose Gel 1 Dose(s) Oral once PRN Blood Glucose LESS THAN 70 milliGRAM(s)/deciliter  glucagon  Injectable 1 milliGRAM(s) IntraMuscular once PRN Glucose LESS THAN 70 milligrams/deciliter  hydrocortisone 2.5% Cream 1 Application(s) Topical two times a day PRN Rash and/or Itching      Vital Signs Last 24 Hrs  T(C): 37 (2017 20:24), Max: 37 (2017 20:24)  T(F): 98.6 (2017 20:24), Max: 98.6 (2017 20:24)  HR: 100 (2017 20:24) (65 - 100)  BP: 110/57 (2017 20:24) (110/57 - 148/74)  BP(mean): --  RR: 17 (2017 20:24) (17 - 18)  SpO2: 97% (2017 20:24) (96% - 100%)    Constitutional: No fever, fatigue  Skin: No rash.  Eyes: No recent vision problems or eye pain.  ENT: No congestion, ear pain, or sore throat.  Cardiovascular: No chest pain or palpation.  Respiratory: No cough, shortness of breath, congestion, or wheezing.  Gastrointestinal: No abdominal pain, nausea, vomiting, or diarrhea.  Genitourinary: No dysuria.  Musculoskeletal: No joint swelling.  Neurologic: No headache.    PHYSICAL EXAM:  GENERAL: NAD  EYES: EOMI, PERRLA  NECK: Supple, No JVD  CHEST/LUNG: dec breath sounds at bases   HEART:  S1 , S2 +  ABDOMEN: soft, bs+  EXTREMITIES:edema+   NEUROLOGY:alert awake calm cooperative    LABS:      144  |  106  |  37<H>  ----------------------------<  115<H>  4.1   |  28  |  1.37<H>    Ca    9.0      2017 08:17  Mg     1.9           Creatinine Trend: 1.37 <--, 1.61 <--, 1.48 <--, 1.33 <--, 1.38 <--, 1.53 <--                        10.4   7.93  )-----------( 257      ( 2017 08:17 )             35.7     Urine Studies:  Urinalysis Basic - ( 2017 22:10 )    Color: PLYEL / Appearance: CLEAR / S.018 / pH: 6.0  Gluc: NEGATIVE / Ketone: NEGATIVE  / Bili: NEGATIVE / Urobili: NORMAL E.U.   Blood: NEGATIVE / Protein: 20 / Nitrite: NEGATIVE   Leuk Esterase: MODERATE / RBC: 5-10 / WBC 2-5   Sq Epi: FEW / Non Sq Epi:  / Bacteria: FEW
Cardiovascular Disease Progress Note    Overnight events: No acute events overnight. Ms. Hamilton feels well. No chest pain or SOB.   Otherwise review of systems negative    Objective Findings:  T(C): 36.7 (11-24-17 @ 04:58), Max: 36.8 (11-23-17 @ 11:30)  HR: 80 (11-24-17 @ 04:58) (71 - 88)  BP: 112/63 (11-24-17 @ 04:58) (110/70 - 134/66)  RR: 17 (11-24-17 @ 04:58) (16 - 17)  SpO2: 97% (11-24-17 @ 04:58) (97% - 98%)  Wt(kg): --  Daily     Daily       Physical Exam:  Gen: NAD  HEENT: EOMI  CV: RRR, normal S1 + S2, no m/r/g  Lungs: CTAB  Abd: soft, non-tender  Ext: No edema    Telemetry: Sinus 60-70s; no ectopy    Laboratory Data:                        10.5   8.51  )-----------( 289      ( 24 Nov 2017 07:00 )             35.4     11-24    143  |  106  |  36<H>  ----------------------------<  139<H>  4.2   |  27  |  1.61<H>    Ca    9.0      24 Nov 2017 07:00  Mg     2.0     11-23      Inpatient Medications:  MEDICATIONS  (STANDING):  ammonium lactate 12% Lotion 1 Application(s) Topical two times a day  atorvastatin 40 milliGRAM(s) Oral at bedtime  cholecalciferol 1000 Unit(s) Oral daily  dextrose 5%. 1000 milliLiter(s) (50 mL/Hr) IV Continuous <Continuous>  dextrose 50% Injectable 12.5 Gram(s) IV Push once  dextrose 50% Injectable 25 Gram(s) IV Push once  dextrose 50% Injectable 25 Gram(s) IV Push once  docusate sodium 100 milliGRAM(s) Oral two times a day  ferrous    sulfate 325 milliGRAM(s) Oral daily  gabapentin 200 milliGRAM(s) Oral two times a day  insulin glargine Injectable (LANTUS) 14 Unit(s) SubCutaneous at bedtime  insulin lispro (HumaLOG) corrective regimen sliding scale   SubCutaneous three times a day before meals  insulin lispro (HumaLOG) corrective regimen sliding scale   SubCutaneous at bedtime  isosorbide   mononitrate ER Tablet (IMDUR) 30 milliGRAM(s) Oral daily  levETIRAcetam 500 milliGRAM(s) Oral two times a day  metoprolol     tartrate 12.5 milliGRAM(s) Oral two times a day  rivaroxaban 15 milliGRAM(s) Oral every 24 hours  senna 2 Tablet(s) Oral at bedtime  sodium chloride 0.9%. 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    #Syncope-   Patient has ruled out for acute coronary syndrome.  No ventricular ectopy or bradycardia on telemetry thus far.  Awaiting TTE to assess for valvulopathy (known RV dysfunction).    #Paroxysmal A-fib-   Patient continues to be in sinus on telemetry.   Continue Xarelto and statin for h/o CVA    #HTN- BP controlled on current regimen. 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    #Syncope-   Patient has ruled out for acute coronary syndrome.  No ventricular ectopy or bradycardia on telemetry thus far.  Admit to telemetry and obtain TTE to assess for valvulopathy (known RV dysfunction).    #A-fib- rate controlled.   Continue Xarelto and statin for h/o CVA    #HTN- BP controlled on current regimen.    D/C planning pending echo.     Over 35 minutes spent on total encounter; more than 50% of the visit was spent counseling and/or coordinating care by the attending physician.      Fernando Pierce M.D.   Cardiovascular Disease  [phone]
Cardiovascular Disease Progress Note    Overnight events: No acute events overnight. Ms. Hamilton feels well. No chest pain or SOB.   Otherwise review of systems negative    Objective Findings:  T(C): 36.7 (11-25-17 @ 05:19), Max: 36.9 (11-24-17 @ 17:44)  HR: 67 (11-25-17 @ 05:19) (67 - 79)  BP: 123/72 (11-25-17 @ 05:19) (123/72 - 137/68)  RR: 16 (11-25-17 @ 05:19) (16 - 17)  SpO2: 100% (11-25-17 @ 05:19) (98% - 100%)  Wt(kg): --  Daily     Daily       Physical Exam:  Gen: NAD  HEENT: EOMI  CV: RRR, normal S1 + S2, no m/r/g  Lungs: CTAB  Abd: soft, non-tender  Ext: No edema    Telemetry: Sinus; no ectopy    Laboratory Data:                        10.4   7.93  )-----------( 257      ( 25 Nov 2017 08:17 )             35.7     11-25    144  |  106  |  37<H>  ----------------------------<  115<H>  4.1   |  28  |  1.37<H>    Ca    9.0      25 Nov 2017 08:17  Mg     1.9     11-25      Inpatient Medications:  MEDICATIONS  (STANDING):  ammonium lactate 12% Lotion 1 Application(s) Topical two times a day  atorvastatin 40 milliGRAM(s) Oral at bedtime  cholecalciferol 1000 Unit(s) Oral daily  dextrose 5%. 1000 milliLiter(s) (50 mL/Hr) IV Continuous <Continuous>  dextrose 50% Injectable 12.5 Gram(s) IV Push once  dextrose 50% Injectable 25 Gram(s) IV Push once  dextrose 50% Injectable 25 Gram(s) IV Push once  docusate sodium 100 milliGRAM(s) Oral two times a day  ferrous    sulfate 325 milliGRAM(s) Oral daily  gabapentin 200 milliGRAM(s) Oral two times a day  insulin glargine Injectable (LANTUS) 14 Unit(s) SubCutaneous at bedtime  insulin lispro (HumaLOG) corrective regimen sliding scale   SubCutaneous three times a day before meals  insulin lispro (HumaLOG) corrective regimen sliding scale   SubCutaneous at bedtime  isosorbide   mononitrate ER Tablet (IMDUR) 30 milliGRAM(s) Oral daily  levETIRAcetam 500 milliGRAM(s) Oral two times a day  metoprolol     tartrate 12.5 milliGRAM(s) Oral two times a day  rivaroxaban 15 milliGRAM(s) Oral every 24 hours  senna 2 Tablet(s) Oral at bedtime  sodium chloride 0.9%. 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    #Syncope-   Patient has ruled out for acute coronary syndrome.  No ventricular ectopy or bradycardia on telemetry thus far.  TTE reviewed- no significant structural heart disease noted.     #Paroxysmal A-fib-   Patient continues to be in sinus on telemetry.   Continue Xarelto and statin for h/o CVA    #HTN- BP controlled on current regimen. 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    #Syncope-   Patient has ruled out for acute coronary syndrome.  No ventricular ectopy or bradycardia on telemetry thus far.  Admit to telemetry and obtain TTE to assess for valvulopathy (known RV dysfunction).    #A-fib- rate controlled.   Continue Xarelto and statin for h/o CVA    #HTN- BP controlled on current regimen.    Awaiting d/c back to nursing home.     Over 35 minutes spent on total encounter; more than 50% of the visit was spent counseling and/or coordinating care by the attending physician.      Fernando Pierce M.D.   Cardiovascular Disease  [phone]
Cardiovascular Disease Progress Note    Overnight events: No acute events overnight. Ms. Hamilton feels well. No chest pain or SOB.   Otherwise review of systems negative    Objective Findings:  T(C): 36.7 (11-26-17 @ 13:19), Max: 36.8 (11-25-17 @ 21:24)  HR: 77 (11-26-17 @ 13:19) (65 - 77)  BP: 148/74 (11-26-17 @ 13:19) (113/66 - 148/74)  RR: 18 (11-26-17 @ 13:19) (17 - 18)  SpO2: 99% (11-26-17 @ 13:19) (96% - 99%)  Wt(kg): --  Daily     Daily       Physical Exam:  Gen: NAD  HEENT: EOMI  CV: RRR, normal S1 + S2, no m/r/g  Lungs: CTAB  Abd: soft, non-tender  Ext: No edema    Telemetry: Sinus; no ectopy    Laboratory Data:                        10.4   7.93  )-----------( 257      ( 25 Nov 2017 08:17 )             35.7     11-25    144  |  106  |  37<H>  ----------------------------<  115<H>  4.1   |  28  |  1.37<H>    Ca    9.0      25 Nov 2017 08:17  Mg     1.9     11-25      Inpatient Medications:  MEDICATIONS  (STANDING):  ammonium lactate 12% Lotion 1 Application(s) Topical two times a day  atorvastatin 40 milliGRAM(s) Oral at bedtime  cholecalciferol 1000 Unit(s) Oral daily  dextrose 5%. 1000 milliLiter(s) (50 mL/Hr) IV Continuous <Continuous>  dextrose 50% Injectable 12.5 Gram(s) IV Push once  dextrose 50% Injectable 25 Gram(s) IV Push once  dextrose 50% Injectable 25 Gram(s) IV Push once  docusate sodium 100 milliGRAM(s) Oral two times a day  ferrous    sulfate 325 milliGRAM(s) Oral daily  gabapentin 200 milliGRAM(s) Oral two times a day  insulin glargine Injectable (LANTUS) 14 Unit(s) SubCutaneous at bedtime  insulin lispro (HumaLOG) corrective regimen sliding scale   SubCutaneous three times a day before meals  insulin lispro (HumaLOG) corrective regimen sliding scale   SubCutaneous at bedtime  isosorbide   mononitrate ER Tablet (IMDUR) 30 milliGRAM(s) Oral daily  levETIRAcetam 500 milliGRAM(s) Oral two times a day  metoprolol     tartrate 12.5 milliGRAM(s) Oral two times a day  rivaroxaban 15 milliGRAM(s) Oral every 24 hours  senna 2 Tablet(s) Oral at bedtime  sodium chloride 0.9%. 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    #Syncope-   Patient has ruled out for acute coronary syndrome.  No ventricular ectopy or bradycardia on telemetry thus far.  TTE reviewed- no significant structural heart disease noted.     #Paroxysmal A-fib-   Patient continues to be in sinus on telemetry.   Continue Xarelto and statin for h/o CVA    #HTN- BP controlled on current regimen. 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    #Syncope-   Patient has ruled out for acute coronary syndrome.  No ventricular ectopy or bradycardia on telemetry thus far.  Admit to telemetry and obtain TTE to assess for valvulopathy (known RV dysfunction).    #A-fib- rate controlled.   Continue Xarelto and statin for h/o CVA    #HTN- BP controlled on current regimen.    No further inpatient cardiac work up warranted at this time.   Awaiting d/c back to nursing home.     Over 35 minutes spent on total encounter; more than 50% of the visit was spent counseling and/or coordinating care by the attending physician.      Fernando Pierce M.D.   Cardiovascular Disease  [phone]
Cardiovascular Disease Progress Note    Overnight events: No acute events overnight. Ms. Hamilton feels well. No chest pain or SOB.   Otherwise review of systems negative    Objective Findings:  T(C): 36.8 (11-26-17 @ 21:52), Max: 37 (11-26-17 @ 20:24)  HR: 70 (11-27-17 @ 05:15) (70 - 100)  BP: 119/57 (11-27-17 @ 05:15) (110/57 - 148/74)  RR: 18 (11-27-17 @ 05:15) (17 - 18)  SpO2: 99% (11-27-17 @ 05:15) (97% - 100%)  Wt(kg): --  Daily     Daily       Physical Exam:  Gen: NAD  HEENT: EOMI  CV: RRR, normal S1 + S2, no m/r/g  Lungs: CTAB  Abd: soft, non-tender  Ext: No edema    Telemetry: Sinus; no ectopy    Laboratory Data:                        10.4   7.93  )-----------( 257      ( 25 Nov 2017 08:17 )             35.7     11-25    144  |  106  |  37<H>  ----------------------------<  115<H>  4.1   |  28  |  1.37<H>    Ca    9.0      25 Nov 2017 08:17  Mg     1.9     11-25                Inpatient Medications:  MEDICATIONS  (STANDING):  ammonium lactate 12% Lotion 1 Application(s) Topical two times a day  atorvastatin 40 milliGRAM(s) Oral at bedtime  cholecalciferol 1000 Unit(s) Oral daily  dextrose 5%. 1000 milliLiter(s) (50 mL/Hr) IV Continuous <Continuous>  dextrose 50% Injectable 12.5 Gram(s) IV Push once  dextrose 50% Injectable 25 Gram(s) IV Push once  dextrose 50% Injectable 25 Gram(s) IV Push once  docusate sodium 100 milliGRAM(s) Oral two times a day  ferrous    sulfate 325 milliGRAM(s) Oral daily  gabapentin 200 milliGRAM(s) Oral two times a day  insulin glargine Injectable (LANTUS) 14 Unit(s) SubCutaneous at bedtime  insulin lispro (HumaLOG) corrective regimen sliding scale   SubCutaneous three times a day before meals  insulin lispro (HumaLOG) corrective regimen sliding scale   SubCutaneous at bedtime  isosorbide   mononitrate ER Tablet (IMDUR) 30 milliGRAM(s) Oral daily  levETIRAcetam 500 milliGRAM(s) Oral two times a day  metoprolol     tartrate 12.5 milliGRAM(s) Oral two times a day  rivaroxaban 15 milliGRAM(s) Oral every 24 hours  senna 2 Tablet(s) Oral at bedtime  sodium chloride 0.9%. 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    #Syncope-   Patient has ruled out for acute coronary syndrome.  No ventricular ectopy or bradycardia on telemetry thus far.  TTE reviewed- no significant structural heart disease noted.     #Paroxysmal A-fib-   Patient continues to be in sinus on telemetry.   Continue Xarelto and statin for h/o CVA    #HTN- BP controlled on current regimen. 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    #Syncope-   Patient has ruled out for acute coronary syndrome.  No ventricular ectopy or bradycardia on telemetry thus far.  Admit to telemetry and obtain TTE to assess for valvulopathy (known RV dysfunction).    #A-fib- rate controlled.   Continue Xarelto and statin for h/o CVA    #HTN- BP controlled on current regimen.  Resume home dose lisinopril 2.5mg daily, as NIMA resolved.     No further inpatient cardiac work up warranted at this time.   Awaiting d/c back to nursing home.     Discussed with patient at length.     Over 35 minutes spent on total encounter; more than 50% of the visit was spent counseling and/or coordinating care by the attending physician.      Fernando Pierce M.D.   Cardiovascular Disease  [phone]
Cardiovascular Disease Progress Note    Overnight events: No acute events overnight. Ms. Hamilton feels well. No chest pain or SOB. No further episodes of syncope.   Otherwise review of systems negative    Objective Findings:  T(C): 36.2 (11-22-17 @ 04:49), Max: 37.1 (11-21-17 @ 12:21)  HR: 63 (11-22-17 @ 04:49) (63 - 74)  BP: 153/69 (11-22-17 @ 04:49) (111/52 - 153/69)  RR: 18 (11-22-17 @ 04:49) (16 - 18)  SpO2: 100% (11-22-17 @ 04:49) (97% - 100%)  Wt(kg): --  Daily     Daily       Physical Exam:  Gen: NAD  HEENT: EOMI  CV: RRR, normal S1 + S2, no m/r/g  Lungs: CTAB  Abd: soft, non-tender  Ext: No edema    Telemetry: Sinus 50-70s    Laboratory Data:                        10.4   7.71  )-----------( 208      ( 22 Nov 2017 06:30 )             34.1     11-21    146<H>  |  107  |  29<H>  ----------------------------<  136<H>  4.4   |  25  |  1.38<H>    Ca    8.7      21 Nov 2017 02:47  Mg     1.7     11-21    TPro  7.5  /  Alb  3.3  /  TBili  0.2  /  DBili  x   /  AST  18  /  ALT  7   /  AlkPhos  83  11-20    PT/INR - ( 20 Nov 2017 21:35 )   PT: 16.3 SEC;   INR: 1.44          PTT - ( 20 Nov 2017 21:35 )  PTT:33.6 SEC  CARDIAC MARKERS ( 21 Nov 2017 02:47 )  x     / < 0.06 ng/mL / 46 u/L / 1.05 ng/mL / x      CARDIAC MARKERS ( 20 Nov 2017 21:35 )  x     / < 0.06 ng/mL / 43 u/L / 1.12 ng/mL / x              Inpatient Medications:  MEDICATIONS  (STANDING):  atorvastatin 40 milliGRAM(s) Oral at bedtime  cholecalciferol 1000 Unit(s) Oral daily  dextrose 5%. 1000 milliLiter(s) (50 mL/Hr) IV Continuous <Continuous>  dextrose 50% Injectable 12.5 Gram(s) IV Push once  dextrose 50% Injectable 25 Gram(s) IV Push once  dextrose 50% Injectable 25 Gram(s) IV Push once  ferrous    sulfate 325 milliGRAM(s) Oral daily  gabapentin 200 milliGRAM(s) Oral two times a day  insulin glargine Injectable (LANTUS) 14 Unit(s) SubCutaneous at bedtime  insulin lispro (HumaLOG) corrective regimen sliding scale   SubCutaneous three times a day before meals  insulin lispro (HumaLOG) corrective regimen sliding scale   SubCutaneous at bedtime  isosorbide   mononitrate ER Tablet (IMDUR) 30 milliGRAM(s) Oral daily  levETIRAcetam 500 milliGRAM(s) Oral two times a day  metoprolol     tartrate 12.5 milliGRAM(s) Oral two times a day  rivaroxaban 15 milliGRAM(s) Oral every 24 hours  sodium chloride 0.9%. 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    #Syncope-   Patient has ruled out for acute coronary syndrome.  No ventricular ectopy or bradycardia on telemetry thus far.  Awaiting TTE to assess for valvulopathy (known RV dysfunction).    #Paroxysmal A-fib-   Patient continues to be in sinus on telemetry.   Continue Xarelto and statin for h/o CVA    #HTN- BP controlled on current regimen.  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    #Syncope-   Patient has ruled out for acute coronary syndrome.  No ventricular ectopy or bradycardia on telemetry thus far.  Admit to telemetry and obtain TTE to assess for valvulopathy (known RV dysfunction).    #A-fib- rate controlled.   Continue Xarelto and statin for h/o CVA    #HTN- BP controlled on current regimen.      Over 35 minutes spent on total encounter; more than 50% of the visit was spent counseling and/or coordinating care by the attending physician.      Fernando Pierce M.D.   Cardiovascular Disease  [phone]
Cardiovascular Disease Progress Note    Overnight events: No acute events overnight. Ms. Hamilton feels well. No chest pain or further episodes of syncope.   Otherwise review of systems negative    Objective Findings:  T(C): 36.9 (11-23-17 @ 06:19), Max: 36.9 (11-23-17 @ 06:19)  HR: 69 (11-23-17 @ 06:19) (66 - 86)  BP: 124/65 (11-23-17 @ 06:19) (123/67 - 144/85)  RR: 16 (11-23-17 @ 06:19) (16 - 17)  SpO2: 99% (11-23-17 @ 06:19) (99% - 100%)  Wt(kg): --  Daily     Daily       Physical Exam:  Gen: NAD  HEENT: EOMI  CV: RRR, normal S1 + S2, no m/r/g  Lungs: CTAB  Abd: soft, non-tender  Ext: No edema    Telemetry: Sinus 70-80s; no ectopy    Laboratory Data:                        10.1   8.72  )-----------( 241      ( 23 Nov 2017 05:30 )             33.4     11-23    146<H>  |  106  |  35<H>  ----------------------------<  116<H>  4.1   |  26  |  1.48<H>    Ca    8.7      23 Nov 2017 05:30  Phos  2.9     11-22  Mg     2.0     11-23        Inpatient Medications:  MEDICATIONS  (STANDING):  atorvastatin 40 milliGRAM(s) Oral at bedtime  cholecalciferol 1000 Unit(s) Oral daily  dextrose 5%. 1000 milliLiter(s) (50 mL/Hr) IV Continuous <Continuous>  dextrose 50% Injectable 12.5 Gram(s) IV Push once  dextrose 50% Injectable 25 Gram(s) IV Push once  dextrose 50% Injectable 25 Gram(s) IV Push once  docusate sodium 100 milliGRAM(s) Oral two times a day  ferrous    sulfate 325 milliGRAM(s) Oral daily  gabapentin 200 milliGRAM(s) Oral two times a day  insulin glargine Injectable (LANTUS) 14 Unit(s) SubCutaneous at bedtime  insulin lispro (HumaLOG) corrective regimen sliding scale   SubCutaneous three times a day before meals  insulin lispro (HumaLOG) corrective regimen sliding scale   SubCutaneous at bedtime  isosorbide   mononitrate ER Tablet (IMDUR) 30 milliGRAM(s) Oral daily  levETIRAcetam 500 milliGRAM(s) Oral two times a day  metoprolol     tartrate 12.5 milliGRAM(s) Oral two times a day  rivaroxaban 15 milliGRAM(s) Oral every 24 hours  senna 2 Tablet(s) Oral at bedtime  sodium chloride 0.9%. 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    #Syncope-   Patient has ruled out for acute coronary syndrome.  No ventricular ectopy or bradycardia on telemetry thus far.  Awaiting TTE to assess for valvulopathy (known RV dysfunction).    #Paroxysmal A-fib-   Patient continues to be in sinus on telemetry.   Continue Xarelto and statin for h/o CVA    #HTN- BP controlled on current regimen. 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    #Syncope-   Patient has ruled out for acute coronary syndrome.  No ventricular ectopy or bradycardia on telemetry thus far.  Admit to telemetry and obtain TTE to assess for valvulopathy (known RV dysfunction).    #A-fib- rate controlled.   Continue Xarelto and statin for h/o CVA    #HTN- BP controlled on current regimen.      Over 35 minutes spent on total encounter; more than 50% of the visit was spent counseling and/or coordinating care by the attending physician.      Fernando Pierce M.D.   Cardiovascular Disease  [phone]
SUBJECTIVE / OVERNIGHT EVENTS: pt denies chest pain, shortness of breath, nausea , v      MEDICATIONS  (STANDING):  atorvastatin 40 milliGRAM(s) Oral at bedtime  cholecalciferol 1000 Unit(s) Oral daily  dextrose 5%. 1000 milliLiter(s) (50 mL/Hr) IV Continuous <Continuous>  dextrose 50% Injectable 12.5 Gram(s) IV Push once  dextrose 50% Injectable 25 Gram(s) IV Push once  dextrose 50% Injectable 25 Gram(s) IV Push once  docusate sodium 100 milliGRAM(s) Oral two times a day  ferrous    sulfate 325 milliGRAM(s) Oral daily  gabapentin 200 milliGRAM(s) Oral two times a day  insulin glargine Injectable (LANTUS) 14 Unit(s) SubCutaneous at bedtime  insulin lispro (HumaLOG) corrective regimen sliding scale   SubCutaneous three times a day before meals  insulin lispro (HumaLOG) corrective regimen sliding scale   SubCutaneous at bedtime  isosorbide   mononitrate ER Tablet (IMDUR) 30 milliGRAM(s) Oral daily  levETIRAcetam 500 milliGRAM(s) Oral two times a day  metoprolol     tartrate 12.5 milliGRAM(s) Oral two times a day  rivaroxaban 15 milliGRAM(s) Oral every 24 hours  senna 2 Tablet(s) Oral at bedtime  sodium chloride 0.9%. 1000 milliLiter(s) (50 mL/Hr) IV Continuous <Continuous>  tobramycin 0.3% Solution 1 Drop(s) Right EYE three times a day  traZODone 50 milliGRAM(s) Oral daily    MEDICATIONS  (PRN):  acetaminophen   Tablet. 650 milliGRAM(s) Oral every 6 hours PRN Moderate Pain (4 - 6)  dextrose Gel 1 Dose(s) Oral once PRN Blood Glucose LESS THAN 70 milliGRAM(s)/deciliter  glucagon  Injectable 1 milliGRAM(s) IntraMuscular once PRN Glucose LESS THAN 70 milligrams/deciliter  hydrocortisone 2.5% Cream 1 Application(s) Topical two times a day PRN Rash and/or Itching        CAPILLARY BLOOD GLUCOSE      POCT Blood Glucose.: 146 mg/dL (2017 21:44)  POCT Blood Glucose.: 157 mg/dL (2017 17:52)  POCT Blood Glucose.: 128 mg/dL (2017 12:54)  POCT Blood Glucose.: 116 mg/dL (2017 09:41)    I&O's Summary      Constitutional: No fever, fatigue  Skin: No rash.  Eyes: No recent vision problems or eye pain.  ENT: No congestion, ear pain, or sore throat.  Cardiovascular: No chest pain or palpation.  Respiratory: No cough, shortness of breath, congestion, or wheezing.  Gastrointestinal: No abdominal pain, nausea, vomiting, or diarrhea.  Genitourinary: No dysuria.  Musculoskeletal: No joint swelling.  Neurologic: No headache.    PHYSICAL EXAM:  GENERAL: NAD  EYES: EOMI, PERRLA  NECK: Supple, No JVD  CHEST/LUNG: dec breath sounds at bases   HEART:  S1 , S2 +  ABDOMEN: soft, bs+  EXTREMITIES:edema+   NEUROLOGY:alert awake calm cooperative      LABS:      145  |  108<H>  |  31<H>  ----------------------------<  103<H>  4.4   |  23  |  1.33<H>    Ca    9.0      2017 06:30  Phos  2.9       Mg     2.1           Creatinine Trend: 1.33 <--, 1.38 <--, 1.53 <--                        10.4   7.71  )-----------( 208      ( 2017 06:30 )             34.1     Urine Studies:  Urinalysis Basic - ( 2017 22:10 )    Color: PLYEL / Appearance: CLEAR / S.018 / pH: 6.0  Gluc: NEGATIVE / Ketone: NEGATIVE  / Bili: NEGATIVE / Urobili: NORMAL E.U.   Blood: NEGATIVE / Protein: 20 / Nitrite: NEGATIVE   Leuk Esterase: MODERATE / RBC: 5-10 / WBC 2-5   Sq Epi: FEW / Non Sq Epi:  / Bacteria: FEW        CARDIAC MARKERS ( 2017 02:47 )  x     / < 0.06 ng/mL / 46 u/L / 1.05 ng/mL / x
SUBJECTIVE / OVERNIGHT EVENTS: pt denies chest pain, shortness of breath, nausea , v    MEDICATIONS  (STANDING):  ammonium lactate 12% Lotion 1 Application(s) Topical two times a day  atorvastatin 40 milliGRAM(s) Oral at bedtime  cholecalciferol 1000 Unit(s) Oral daily  dextrose 5%. 1000 milliLiter(s) (50 mL/Hr) IV Continuous <Continuous>  dextrose 50% Injectable 12.5 Gram(s) IV Push once  dextrose 50% Injectable 25 Gram(s) IV Push once  dextrose 50% Injectable 25 Gram(s) IV Push once  docusate sodium 100 milliGRAM(s) Oral two times a day  ferrous    sulfate 325 milliGRAM(s) Oral daily  gabapentin 200 milliGRAM(s) Oral two times a day  insulin glargine Injectable (LANTUS) 14 Unit(s) SubCutaneous at bedtime  insulin lispro (HumaLOG) corrective regimen sliding scale   SubCutaneous three times a day before meals  insulin lispro (HumaLOG) corrective regimen sliding scale   SubCutaneous at bedtime  isosorbide   mononitrate ER Tablet (IMDUR) 30 milliGRAM(s) Oral daily  levETIRAcetam 500 milliGRAM(s) Oral two times a day  metoprolol     tartrate 12.5 milliGRAM(s) Oral two times a day  rivaroxaban 15 milliGRAM(s) Oral every 24 hours  senna 2 Tablet(s) Oral at bedtime  sodium chloride 0.9%. 1000 milliLiter(s) (50 mL/Hr) IV Continuous <Continuous>  tobramycin 0.3% Solution 1 Drop(s) Right EYE three times a day  traZODone 50 milliGRAM(s) Oral daily    MEDICATIONS  (PRN):  acetaminophen   Tablet. 650 milliGRAM(s) Oral every 6 hours PRN Moderate Pain (4 - 6)  dextrose Gel 1 Dose(s) Oral once PRN Blood Glucose LESS THAN 70 milliGRAM(s)/deciliter  glucagon  Injectable 1 milliGRAM(s) IntraMuscular once PRN Glucose LESS THAN 70 milligrams/deciliter  hydrocortisone 2.5% Cream 1 Application(s) Topical two times a day PRN Rash and/or Itching      Vital Signs Last 24 Hrs  T(C): 36.8 (2017 21:00), Max: 36.9 (2017 17:44)  T(F): 98.2 (2017 21:00), Max: 98.4 (2017 17:44)  HR: 76 (2017 21:00) (73 - 80)  BP: 134/72 (2017 21:00) (112/63 - 137/68)  BP(mean): --  RR: 17 (2017 21:00) (17 - 17)  SpO2: 98% (2017 21:00) (97% - 99%)    Constitutional: No fever, fatigue  Skin: No rash.  Eyes: No recent vision problems or eye pain.  ENT: No congestion, ear pain, or sore throat.  Cardiovascular: No chest pain or palpation.  Respiratory: No cough, shortness of breath, congestion, or wheezing.  Gastrointestinal: No abdominal pain, nausea, vomiting, or diarrhea.  Genitourinary: No dysuria.  Musculoskeletal: No joint swelling.  Neurologic: No headache.    PHYSICAL EXAM:  GENERAL: NAD  EYES: EOMI, PERRLA  NECK: Supple, No JVD  CHEST/LUNG: dec breath sounds at bases   HEART:  S1 , S2 +  ABDOMEN: soft, bs+  EXTREMITIES:edema+   NEUROLOGY:alert awake calm cooperative      LABS:      143  |  106  |  36<H>  ----------------------------<  139<H>  4.2   |  27  |  1.61<H>    Ca    9.0      2017 07:00  Mg     2.0     11-23      Creatinine Trend: 1.61 <--, 1.48 <--, 1.33 <--, 1.38 <--, 1.53 <--                        10.5   8.51  )-----------( 289      ( 2017 07:00 )             35.4     Urine Studies:  Urinalysis Basic - ( 2017 22:10 )    Color: PLYEL / Appearance: CLEAR / S.018 / pH: 6.0  Gluc: NEGATIVE / Ketone: NEGATIVE  / Bili: NEGATIVE / Urobili: NORMAL E.U.   Blood: NEGATIVE / Protein: 20 / Nitrite: NEGATIVE   Leuk Esterase: MODERATE / RBC: 5-10 / WBC 2-5   Sq Epi: FEW / Non Sq Epi:  / Bacteria: FEW
SUBJECTIVE / OVERNIGHT EVENTS: pt denies chest pain, shortness of breath, nausea , v    MEDICATIONS  (STANDING):  ammonium lactate 12% Lotion 1 Application(s) Topical two times a day  atorvastatin 40 milliGRAM(s) Oral at bedtime  cholecalciferol 1000 Unit(s) Oral daily  dextrose 5%. 1000 milliLiter(s) (50 mL/Hr) IV Continuous <Continuous>  dextrose 50% Injectable 12.5 Gram(s) IV Push once  dextrose 50% Injectable 25 Gram(s) IV Push once  dextrose 50% Injectable 25 Gram(s) IV Push once  docusate sodium 100 milliGRAM(s) Oral two times a day  ferrous    sulfate 325 milliGRAM(s) Oral daily  gabapentin 200 milliGRAM(s) Oral two times a day  insulin glargine Injectable (LANTUS) 14 Unit(s) SubCutaneous at bedtime  insulin lispro (HumaLOG) corrective regimen sliding scale   SubCutaneous three times a day before meals  insulin lispro (HumaLOG) corrective regimen sliding scale   SubCutaneous at bedtime  isosorbide   mononitrate ER Tablet (IMDUR) 30 milliGRAM(s) Oral daily  levETIRAcetam 500 milliGRAM(s) Oral two times a day  metoprolol     tartrate 12.5 milliGRAM(s) Oral two times a day  rivaroxaban 15 milliGRAM(s) Oral every 24 hours  senna 2 Tablet(s) Oral at bedtime  sodium chloride 0.9%. 1000 milliLiter(s) (50 mL/Hr) IV Continuous <Continuous>  tobramycin 0.3% Solution 1 Drop(s) Right EYE three times a day  traZODone 50 milliGRAM(s) Oral daily    MEDICATIONS  (PRN):  acetaminophen   Tablet. 650 milliGRAM(s) Oral every 6 hours PRN Moderate Pain (4 - 6)  dextrose Gel 1 Dose(s) Oral once PRN Blood Glucose LESS THAN 70 milliGRAM(s)/deciliter  glucagon  Injectable 1 milliGRAM(s) IntraMuscular once PRN Glucose LESS THAN 70 milligrams/deciliter  hydrocortisone 2.5% Cream 1 Application(s) Topical two times a day PRN Rash and/or Itching      Vital Signs Last 24 Hrs  T(C): 36.8 (2017 21:24), Max: 36.9 (2017 13:26)  T(F): 98.3 (2017 21:24), Max: 98.5 (2017 13:26)  HR: 74 (2017 21:24) (67 - 74)  BP: 130/75 (2017 21:24) (123/72 - 130/75)  BP(mean): --  RR: 17 (2017 21:24) (16 - 18)  SpO2: 96% (2017 21:24) (96% - 100%)    Constitutional: No fever, fatigue  Skin: No rash.  Eyes: No recent vision problems or eye pain.  ENT: No congestion, ear pain, or sore throat.  Cardiovascular: No chest pain or palpation.  Respiratory: No cough, shortness of breath, congestion, or wheezing.  Gastrointestinal: No abdominal pain, nausea, vomiting, or diarrhea.  Genitourinary: No dysuria.  Musculoskeletal: No joint swelling.  Neurologic: No headache.    PHYSICAL EXAM:  GENERAL: NAD  EYES: EOMI, PERRLA  NECK: Supple, No JVD  CHEST/LUNG: dec breath sounds at bases   HEART:  S1 , S2 +  ABDOMEN: soft, bs+  EXTREMITIES:edema+   NEUROLOGY:alert awake calm cooperative      LABS:      144  |  106  |  37<H>  ----------------------------<  115<H>  4.1   |  28  |  1.37<H>    Ca    9.0      2017 08:17  Mg     1.9           Creatinine Trend: 1.37 <--, 1.61 <--, 1.48 <--, 1.33 <--, 1.38 <--, 1.53 <--                        10.4   7.93  )-----------( 257      ( 2017 08:17 )             35.7     Urine Studies:  Urinalysis Basic - ( 2017 22:10 )    Color: PLYEL / Appearance: CLEAR / S.018 / pH: 6.0  Gluc: NEGATIVE / Ketone: NEGATIVE  / Bili: NEGATIVE / Urobili: NORMAL E.U.   Blood: NEGATIVE / Protein: 20 / Nitrite: NEGATIVE   Leuk Esterase: MODERATE / RBC: 5-10 / WBC 2-5   Sq Epi: FEW / Non Sq Epi:  / Bacteria: FEW

## 2017-11-27 NOTE — PROGRESS NOTE ADULT - PROVIDER SPECIALTY LIST ADULT
Cardiology
Internal Medicine

## 2017-12-12 ENCOUNTER — APPOINTMENT (OUTPATIENT)
Dept: CARDIOLOGY | Facility: CLINIC | Age: 77
End: 2017-12-12

## 2020-12-02 NOTE — H&P ADULT - NEGATIVE GENERAL GENITOURINARY SYMPTOMS
no edema, no murmurs, regular rate and rhythm no dysuria/no flank pain R/no hematuria/no flank pain L/no renal colic

## 2022-01-28 NOTE — PATIENT PROFILE ADULT. - TEACHING/LEARNING LEARNING PREFERENCES
Preventive Health Recommendations    See your health care provider every year to    Review health changes.     Discuss preventive care.      Review your medicines if your doctor has prescribed any.    You no longer need a yearly Pap test unless you've had an abnormal Pap test in the past 10 years. If you have vaginal symptoms, such as bleeding or discharge, be sure to talk with your provider about a Pap test.    Every 1 to 2 years, have a mammogram.  If you are over 69, talk with your health care provider about whether or not you want to continue having screening mammograms.    Every 10 years, have a colonoscopy. Or, have a yearly FIT test (stool test). These exams will check for colon cancer.     Have a cholesterol test every 5 years, or more often if your doctor advises it.     Have a diabetes test (fasting glucose) every three years. If you are at risk for diabetes, you should have this test more often.     At age 65, have a bone density scan (DEXA) to check for osteoporosis (brittle bone disease).    Shots:    Get a flu shot each year.    Get a tetanus shot every 10 years.    Talk to your doctor about your pneumonia vaccines. There are now two you should receive - Pneumovax (PPSV 23) and Prevnar (PCV 13).    Talk to your pharmacist about the shingles vaccine.    Talk to your doctor about the hepatitis B vaccine.    Nutrition:     Eat at least 5 servings of fruits and vegetables each day.    Eat whole-grain bread, whole-wheat pasta and brown rice instead of white grains and rice.    Get adequate Calcium and Vitamin D.     Lifestyle    Exercise at least 150 minutes a week (30 minutes a day, 5 days a week). This will help you control your weight and prevent disease.    Limit alcohol to one drink per day.    No smoking.     Wear sunscreen to prevent skin cancer.     See your dentist twice a year for an exam and cleaning.    See your eye doctor every 1 to 2 years to screen for conditions such as glaucoma, macular  degeneration and cataracts.    Personalized Prevention Plan  You are due for the preventive services outlined below.  Your care team is available to assist you in scheduling these services.  If you have already completed any of these items, please share that information with your care team to update in your medical record.  Health Maintenance   Topic Date Due     DEXA  Never done     ADVANCE CARE PLANNING  Never done     MAMMO SCREENING  Never done     HEPATITIS C SCREENING  Never done     ZOSTER IMMUNIZATION (2 of 3) 03/17/2014     MEDICARE ANNUAL WELLNESS VISIT  Never done     Pneumococcal Vaccine: 65+ Years (1 of 1 - PPSV23) Never done     PHQ-9  02/25/2021     INFLUENZA VACCINE (1) Never done     COLORECTAL CANCER SCREENING  12/11/2021     FALL RISK ASSESSMENT  08/01/2022     DTAP/TDAP/TD IMMUNIZATION (2 - Td or Tdap) 12/12/2022     LIPID  03/09/2026     DEPRESSION ACTION PLAN  Completed     COVID-19 Vaccine  Completed     IPV IMMUNIZATION  Aged Out     MENINGITIS IMMUNIZATION  Aged Out     HEPATITIS B IMMUNIZATION  Aged Out     Patient Education   Personalized Prevention Plan  You are due for the preventive services outlined below.  Your care team is available to assist you in scheduling these services.  If you have already completed any of these items, please share that information with your care team to update in your medical record.  Health Maintenance Due   Topic Date Due     Osteoporosis Screening  Never done     Mammogram  Never done     Zoster (Shingles) Vaccine (2 of 3) 03/17/2014     Pneumococcal Vaccine (1 of 1 - PPSV23) Never done     Flu Vaccine (1) Never done     Colorectal Cancer Screening  12/11/2021       Preventing Falls at Home  A person can fall for many reasons. Older adults may fall because reaction time slows down as we age. Your muscles and joints may get stiff, weak, or less flexible because of illness, medicines, or a physical condition.   Other health problems that make falls more  likely include:     Arthritis    Dizziness or lightheadedness when you stand up (orthostatic hypotension)    History of a stroke    Dizziness    Anemia    Certain medicines taken for mental illness or to control blood pressure.    Problems with balance or gait    Bladder or urinary problems    History of falling    Changes in vision (vision impairment)    Changes in thinking skills and memory (cognitive impairment)  Injuries from a fall can include serious injuries such as broken bones, dislocated joints, internal bleeding and cuts. Injuries like these can limit your independence.   Prevention tips  To help prevent falls and fall-related injuries, follow the tips below.    Floors  To make floors safer:     Put nonskid pads under area rugs.    Remove small rugs.    Replace worn floor coverings.    Tack carpets firmly to each step on carpeted stairs. Put nonskid strips on the edges of uncarpeted stairs.    Keep floors and stairs free of clutter and cords.    Arrange furniture so there are clear pathways.    Clean up any spills right away.  Bathrooms    To make bathrooms safer:     Install grab bars in the tub or shower.    Apply nonskid strips or put a nonskid rubber mat in the tub or shower.    Sit on a bath chair to bathe.    Use bathmats with nonskid backing.  Lighting  To improve visibility in your home:      Keep a flashlight in each room. Or put a lamp next to the bed within easy reach.    Put nightlights in the bedrooms, hallways, kitchen, and bathrooms.    Make sure all stairways have good lighting.    Take your time when going up and down stairs.    Put handrails on both sides of stairs and in walkways for more support. To prevent injury to your wrist or arm, don t use handrails to pull yourself up.    Install grab bars to pull yourself up.    Move or rearrange items that you use often. This will make them easier to find or reach.    Look at your home to find any safety hazards. Especially look at doorways,  walkways, and the driveway. Remove or repair any safety problems that you find.  Other changes to make    Look around to find any safety hazards. Look closely at doorways, walkways, and the driveway. Remove or repair any safety problems that you find.    Wear shoes that fit well.    Take your time when going up and down stairs.    Put handrails on both sides of stairs and in walkways for more support. To prevent injury to your wrist or arm, don t use handrails to pull yourself up.    Install grab bars wherever needed to pull yourself up.    Arrange items that you use often. This will make them easier to find or reach.    [employer] last reviewed this educational content on 3/1/2020    1854-4129 [employer]. All rights reserved. This information is not intended as a substitute for professional medical care. Always follow your healthcare professional's instructions.            skill demonstration/verbal instruction

## 2022-10-10 NOTE — H&P ADULT - NS MD HP PULSE DORSALIS
Medication requested: Humalog     Last office visit: 10/6/2022  Next scheduled appointment: 2/10/2023    Per LOV:    \"Change DM regimen:  Lantus 45 units BID ( From 90 nightly ).  Humalog 35 units three times daily with meals plus Sliding scale 2 units for every 30 above 140.  Increase Trulicity 1.5 mg subcutaneous weekly from 0.75\"    Charts reviewed, medication signed per protocol.  
+1 pulses b/l

## 2024-07-08 NOTE — ED ADULT NURSE NOTE - BP NONINVASIVE SYSTOLIC (MM HG)
and went back due to worsening RLQ/  R CVAT? And had CT wo obst uropathy and chronic nephrolithiasis wo pyelo and and was admitted and ID consulted    Hospital Course: Pt admitted  by [facility] Hospitalist group w above. IVF given. Pancultured. IV Ivanz given. Renal and Urology consulted Monday upon my return. Pt was threatening to leave AMA. ID switched to oral abx . Pt refused PICC line said hadnt worked in the past. Already has appt w our offc this week and Renal. Pt left AMA.       Disposition: Pt left AMA to HOME w     Patient Instructions:   Discharge Medication List as of 7/8/2024  3:59 PM        START taking these medications    Details   fosfomycin tromethamine (MONUROL) 3 g PACK Take 1 packet by mouth once for 1 dose, Disp-1 each, R-0Normal           CONTINUE these medications which have NOT CHANGED    Details   cefdinir (OMNICEF) 300 MG capsule Take 1 capsule by mouth 2 times daily for 7 days, Disp-14 capsule, R-0Normal      ondansetron (ZOFRAN) 4 MG tablet Take 1 tablet by mouth every 8 hours as needed for Nausea, Disp-12 tablet, R-0Normal      cephALEXin (KEFLEX) 500 MG capsule Take 1 capsule by mouth 3 times daily for 10 days, Disp-30 capsule, R-0Normal      tamsulosin (FLOMAX) 0.4 MG capsule TAKE 1 CAPSULE BY MOUTH ONCE DAILYHistorical Med      Icosapent Ethyl (VASCEPA) 1 g CAPS capsule TAKE 2 CAPSULES BY MOUTH ONCE DAILYHistorical Med      rosuvastatin (CRESTOR) 10 MG tablet TAKE 1 TABLET BY MOUTH ONCE DAILYHistorical Med      LORazepam (ATIVAN) 1 MG tablet Take 1 tablet by mouth every 6 hours as needed.Historical Med      thyroid (ARMOUR) 60 MG tablet Take 1 tablet by mouth 3 times daily Pt now stating she takes 3 pills of 60mg  _ 180mg daily of this medHistorical Med      cyanocobalamin 100 MCG tablet Take 0.5 tablets by mouth dailyHistorical Med      ammonium lactate (LAC-HYDRIN) 12 % lotion APPLY LOTION TOPICALLY TWICE DAILY, Historical Med      metoprolol succinate (TOPROL XL) 25 MG extended  127
